# Patient Record
Sex: FEMALE | Race: WHITE | NOT HISPANIC OR LATINO | URBAN - METROPOLITAN AREA
[De-identification: names, ages, dates, MRNs, and addresses within clinical notes are randomized per-mention and may not be internally consistent; named-entity substitution may affect disease eponyms.]

---

## 2020-02-13 ENCOUNTER — OFFICE VISIT (OUTPATIENT)
Dept: URGENT CARE | Facility: CLINIC | Age: 35
End: 2020-02-13
Payer: COMMERCIAL

## 2020-02-13 VITALS
HEART RATE: 73 BPM | OXYGEN SATURATION: 100 % | TEMPERATURE: 98 F | DIASTOLIC BLOOD PRESSURE: 53 MMHG | BODY MASS INDEX: 21.97 KG/M2 | SYSTOLIC BLOOD PRESSURE: 110 MMHG | WEIGHT: 140 LBS | HEIGHT: 67 IN

## 2020-02-13 DIAGNOSIS — Z76.0 MEDICATION REFILL: ICD-10-CM

## 2020-02-13 DIAGNOSIS — Z86.69 H/O MIGRAINE WITH AURA: Primary | ICD-10-CM

## 2020-02-13 DIAGNOSIS — Z76.89 ENCOUNTER TO ESTABLISH CARE: ICD-10-CM

## 2020-02-13 PROCEDURE — 99204 PR OFFICE/OUTPT VISIT, NEW, LEVL IV, 45-59 MIN: ICD-10-PCS | Mod: S$GLB,,, | Performed by: NURSE PRACTITIONER

## 2020-02-13 PROCEDURE — 99204 OFFICE O/P NEW MOD 45 MIN: CPT | Mod: S$GLB,,, | Performed by: NURSE PRACTITIONER

## 2020-02-13 RX ORDER — SUMATRIPTAN 50 MG/1
50 TABLET, FILM COATED ORAL
Qty: 30 TABLET | Refills: 0 | Status: SHIPPED | OUTPATIENT
Start: 2020-02-13 | End: 2020-08-26

## 2020-02-13 RX ORDER — ONDANSETRON 4 MG/1
4 TABLET, ORALLY DISINTEGRATING ORAL EVERY 6 HOURS PRN
Qty: 30 TABLET | Refills: 0 | Status: SHIPPED | OUTPATIENT
Start: 2020-02-13 | End: 2022-08-11 | Stop reason: DRUGHIGH

## 2020-02-13 NOTE — PATIENT INSTRUCTIONS
"  referral line number is [phone]    Migraine Headache: Stages and Treatment    A migraine headache tends to progress in stages. Learning these stages can help you better understand what is happening. Then you can learn ways to reduce pain and relieve other symptoms. Methods for relieving your symptoms include self-care and medicines.  Migraine stages  Migraines tend to progress through 4 stages. Many people don't have all stages, and stages may differ with each headache:  · Prodrome. A few hours to a day or so before the headache, you may feel tired, (yawning many times), uneasy, or giraldo. You may also feel bloated or crave certain foods.  · Aura. Up to an hour before the headache starts, some migraine sufferers experience aura--flashing lights, blind spots, other vision problems, confusion, difficulty speaking, or other neurologic symptoms.  · Headache. Moderate to severe pain affects one side of the head and then can spread to both sides, often along with nausea. You may be highly sensitive to light, sound, and odors. Vomiting or diarrhea may also happen. This stage lasts 4 to 72 hours.  · Postdrome. After your headache ends, you may feel tired, achy, and "washed out." This may last for a day or so.  Self-care during a migraine  Here is what you can do:  · Use a cold compress. Wrap a thin cloth around a cold pack, a cold can of soda, or a bag of frozen vegetables. Apply this to your temple or other pain site.  · Drink fluids. If nausea makes it hard to drink, try sucking on ice.  · Rest. If possible, lie down. Try not to bend over, as this may increase your pain. Sometimes laying in a dark quiet room can help the migraine from being aggravated.    · Try caffeine. Some people find that drinking fluids with caffeine, such as coffee or tea, helps to lessen migraine pain.  Using medicines  Work with your healthcare provider to find the right medicines for you. Medicines for migraine may relieve pain " (analgesics), relieve nausea, or attack the migraine's root causes (migraine-specific medicines).  Rebound headache  Taking analgesics each day, or even several times a week, may lead to more frequent and severe headaches. These are called rebound headaches. If you think you're having rebound headaches, tell your healthcare provider. He or she can help you safely decrease your medicine. Rebound caffeine withdrawal headaches can also happen.    Date Last Reviewed: 10/9/2015  © 6548-4202 [employer]. [address] [place] [address], [place] [address]. All rights reserved. This information is not intended as a substitute for professional medical care. Always follow your healthcare professional's instructions.

## 2020-02-13 NOTE — PROGRESS NOTES
"Subjective:       Patient ID: Ayla Edmond is a 34 y.o. female.    Vitals:  height is 5' 7" (1.702 m) and weight is 63.5 kg (140 lb). Her temperature is 98.1 °F (36.7 °C). Her blood pressure is 110/53 (abnormal) and her pulse is 73. Her oxygen saturation is 100%.     Chief Complaint: Migraine    Patient states she is not current having a migraine but she completed all of her migraine medication Friday when she had 1.  She is here for medication refill and to establish care with a PCP and neurologist as she recently got new insurance.    Migraine    This is a chronic problem. Episode onset: last weekend episode of migraine. The problem occurs intermittently. The problem has been gradually worsening. Pain location: pain is behind her eyes. The pain does not radiate. The pain quality is similar to prior headaches. The quality of the pain is described as aching. The pain is at a severity of 0/10. The patient is experiencing no pain. Pertinent negatives include no blurred vision, dizziness, eye pain, fever, loss of balance, nausea, neck pain, photophobia, tinnitus, vomiting or weakness. The symptoms are aggravated by activity, bright light and emotional stress (hormones). Treatments tried: patient out of pain medicine for migraines. The treatment provided no relief. Her past medical history is significant for migraine headaches.       Constitution: Negative for chills, sweating and fever.   HENT: Negative for tinnitus, facial swelling, congestion and sinus pain.    Neck: Negative for neck pain and neck stiffness.   Eyes: Negative for eye pain, photophobia, vision loss, double vision and blurred vision.   Gastrointestinal: Negative for nausea and vomiting.   Genitourinary: Negative for missed menses.   Musculoskeletal: Negative for trauma and muscle ache.   Skin: Negative for rash, wound and lesion.   Neurological: Positive for headaches and history of migraines. Negative for dizziness, history of vertigo, " light-headedness, facial drooping, speech difficulty, coordination disturbances, loss of balance, disorientation and loss of consciousness.   Psychiatric/Behavioral: Negative for disorientation, confusion, nervous/anxious, sleep disturbance and depression. The patient is not nervous/anxious.        Objective:      Physical Exam   Constitutional: She is oriented to person, place, and time. She appears well-developed and well-nourished.  Non-toxic appearance. She does not appear ill. No distress.   HENT:   Head: Normocephalic and atraumatic.   Right Ear: Hearing, tympanic membrane, external ear and ear canal normal.   Left Ear: Hearing, tympanic membrane, external ear and ear canal normal.   Nose: Nose normal. No mucosal edema, rhinorrhea or nasal deformity. No epistaxis. Right sinus exhibits no maxillary sinus tenderness and no frontal sinus tenderness. Left sinus exhibits no maxillary sinus tenderness and no frontal sinus tenderness.   Mouth/Throat: Uvula is midline, oropharynx is clear and moist and mucous membranes are normal. No trismus in the jaw. Normal dentition. No uvula swelling. No posterior oropharyngeal erythema.   Eyes: Pupils are equal, round, and reactive to light. Conjunctivae, EOM and lids are normal. No scleral icterus.   Neck: Trachea normal, normal range of motion, full passive range of motion without pain and phonation normal. Neck supple. No neck rigidity.   Cardiovascular: Normal rate, regular rhythm, normal heart sounds, intact distal pulses and normal pulses.   Pulmonary/Chest: Effort normal and breath sounds normal. No respiratory distress.   Abdominal: Soft. Normal appearance and bowel sounds are normal. She exhibits no distension. There is no tenderness.   Musculoskeletal: Normal range of motion. She exhibits no edema or deformity.   Neurological: She is alert and oriented to person, place, and time. She displays normal reflexes. No cranial nerve deficit or sensory deficit. She exhibits  normal muscle tone. Coordination normal.   Patient is neurologically intact at time of exam without symptoms.  I did discussed referring her to Neurology and PCP.  She is in agreement with plan of care.   Skin: Skin is warm, dry, intact, not diaphoretic and not pale.   Psychiatric: She has a normal mood and affect. Her speech is normal and behavior is normal. Judgment and thought content normal. Cognition and memory are normal.   Nursing note and vitals reviewed.        Assessment:       1. H/O migraine with aura    2. Encounter to establish care    3. Medication refill        Plan:         H/O migraine with aura  -     ondansetron (ZOFRAN-ODT) 4 MG TbDL; Take 1 tablet (4 mg total) by mouth every 6 (six) hours as needed (nausea).  Dispense: 30 tablet; Refill: 0  -     sumatriptan (IMITREX) 50 MG tablet; Take 1 tablet (50 mg total) by mouth every 2 (two) hours as needed for Migraine. Take one, repeat in 2 hours if still symptomatic.  Do not exceed 2 doses in 1 day  Dispense: 30 tablet; Refill: 0  -     Ambulatory referral/consult to Internal Medicine  -     Ambulatory referral/consult to Neurology    Encounter to establish care  -     Ambulatory referral/consult to Internal Medicine  -     Ambulatory referral/consult to Neurology    Medication refill  -     ondansetron (ZOFRAN-ODT) 4 MG TbDL; Take 1 tablet (4 mg total) by mouth every 6 (six) hours as needed (nausea).  Dispense: 30 tablet; Refill: 0  -     sumatriptan (IMITREX) 50 MG tablet; Take 1 tablet (50 mg total) by mouth every 2 (two) hours as needed for Migraine. Take one, repeat in 2 hours if still symptomatic.  Do not exceed 2 doses in 1 day  Dispense: 30 tablet; Refill: 0  -     Ambulatory referral/consult to Internal Medicine  -     Ambulatory referral/consult to Neurology         Patient Instructions       referral line number is [phone]    Migraine Headache: Stages and Treatment    A migraine headache tends to progress in stages. Learning these  "stages can help you better understand what is happening. Then you can learn ways to reduce pain and relieve other symptoms. Methods for relieving your symptoms include self-care and medicines.  Migraine stages  Migraines tend to progress through 4 stages. Many people don't have all stages, and stages may differ with each headache:  · Prodrome. A few hours to a day or so before the headache, you may feel tired, (yawning many times), uneasy, or giraldo. You may also feel bloated or crave certain foods.  · Aura. Up to an hour before the headache starts, some migraine sufferers experience aura--flashing lights, blind spots, other vision problems, confusion, difficulty speaking, or other neurologic symptoms.  · Headache. Moderate to severe pain affects one side of the head and then can spread to both sides, often along with nausea. You may be highly sensitive to light, sound, and odors. Vomiting or diarrhea may also happen. This stage lasts 4 to 72 hours.  · Postdrome. After your headache ends, you may feel tired, achy, and "washed out." This may last for a day or so.  Self-care during a migraine  Here is what you can do:  · Use a cold compress. Wrap a thin cloth around a cold pack, a cold can of soda, or a bag of frozen vegetables. Apply this to your temple or other pain site.  · Drink fluids. If nausea makes it hard to drink, try sucking on ice.  · Rest. If possible, lie down. Try not to bend over, as this may increase your pain. Sometimes laying in a dark quiet room can help the migraine from being aggravated.    · Try caffeine. Some people find that drinking fluids with caffeine, such as coffee or tea, helps to lessen migraine pain.  Using medicines  Work with your healthcare provider to find the right medicines for you. Medicines for migraine may relieve pain (analgesics), relieve nausea, or attack the migraine's root causes (migraine-specific medicines).  Rebound headache  Taking analgesics each day, or even several " times a week, may lead to more frequent and severe headaches. These are called rebound headaches. If you think you're having rebound headaches, tell your healthcare provider. He or she can help you safely decrease your medicine. Rebound caffeine withdrawal headaches can also happen.    Date Last Reviewed: 10/9/2015  © 7722-5970 [employer]. [address] [place] [address], [place] [address]. All rights reserved. This information is not intended as a substitute for professional medical care. Always follow your healthcare professional's instructions.

## 2020-08-12 ENCOUNTER — OFFICE VISIT (OUTPATIENT)
Dept: URGENT CARE | Facility: CLINIC | Age: 35
End: 2020-08-12
Payer: MEDICAID

## 2020-08-12 VITALS
HEART RATE: 90 BPM | WEIGHT: 150 LBS | HEIGHT: 67 IN | OXYGEN SATURATION: 99 % | TEMPERATURE: 98 F | BODY MASS INDEX: 23.54 KG/M2 | DIASTOLIC BLOOD PRESSURE: 64 MMHG | RESPIRATION RATE: 18 BRPM | SYSTOLIC BLOOD PRESSURE: 124 MMHG

## 2020-08-12 DIAGNOSIS — K02.9 ACTIVE DENTAL CARIES: ICD-10-CM

## 2020-08-12 DIAGNOSIS — Z86.69 HISTORY OF TRIGEMINAL NEURALGIA: ICD-10-CM

## 2020-08-12 DIAGNOSIS — K04.7 DENTAL INFECTION: ICD-10-CM

## 2020-08-12 DIAGNOSIS — Z76.89 ENCOUNTER TO ESTABLISH CARE WITH NEW DOCTOR: Primary | ICD-10-CM

## 2020-08-12 DIAGNOSIS — F17.200 TOBACCO DEPENDENCE: ICD-10-CM

## 2020-08-12 PROCEDURE — 99214 PR OFFICE/OUTPT VISIT, EST, LEVL IV, 30-39 MIN: ICD-10-PCS | Mod: S$GLB,,, | Performed by: NURSE PRACTITIONER

## 2020-08-12 PROCEDURE — 99214 OFFICE O/P EST MOD 30 MIN: CPT | Mod: S$GLB,,, | Performed by: NURSE PRACTITIONER

## 2020-08-12 RX ORDER — CLINDAMYCIN HYDROCHLORIDE 300 MG/1
CAPSULE ORAL
COMMUNITY
Start: 2020-05-27 | End: 2020-08-13

## 2020-08-12 RX ORDER — ONDANSETRON 4 MG/1
4 TABLET, ORALLY DISINTEGRATING ORAL EVERY 6 HOURS PRN
Qty: 12 TABLET | Refills: 0 | Status: SHIPPED | OUTPATIENT
Start: 2020-08-12 | End: 2021-02-10 | Stop reason: SDUPTHER

## 2020-08-12 RX ORDER — PENICILLIN V POTASSIUM 500 MG/1
500 TABLET, FILM COATED ORAL EVERY 6 HOURS
Qty: 28 TABLET | Refills: 0 | Status: SHIPPED | OUTPATIENT
Start: 2020-08-12 | End: 2020-08-19

## 2020-08-12 RX ORDER — GABAPENTIN 300 MG/1
300 CAPSULE ORAL 3 TIMES DAILY
Qty: 42 CAPSULE | Refills: 0 | Status: SHIPPED | OUTPATIENT
Start: 2020-08-12 | End: 2020-08-26 | Stop reason: SDUPTHER

## 2020-08-12 NOTE — PATIENT INSTRUCTIONS
FOLLOW UP WITH NEUROLOGY AND FAMILY PRACTICE FOR YOUR MEDICATIONS AS YOU ONLY RECEIVED A 2 WEEK AMOUNT    ALSO FOLLOW UP WITH DENTIST TO HAVE TEETH EXTRACTED    Educated on smoking cessation      You must understand that you've received an Urgent Care treatment only and that you may be released before all your medical problems are known or treated. You, the patient, will arrange for follow up care as instructed.  If your condition worsens we recommend that you receive another evaluation at the emergency room immediately or contact your primary medical clinics after hours call service to discuss your concerns.  Please return here or go to the Emergency Department for any concerns or worsening of condition.          Understanding Tooth Decay  Plaque is a sticky coating of bacteria and other substances that forms on your teeth and gums. It can cause 2 serious problems: tooth decay and gum disease. These problems damage the teeth and gums, and may even lead to tooth loss. When the mouth is well cared for, tooth decay and gum disease can be reversed in their early stages. Better yet, you can prevent these problems from starting by brushing and flossing daily and avoiding between meal snacking on foods high in sugar and starch.     Once tooth decay eats through the enamel, it spreads quickly through the softer dentin.       After tooth decay is removed, the hole is filled with a hard material.      How tooth decay develops  Tooth decay happens when bacteria in plaque make acids that eat away at the tooth. Cavities (also called caries) are holes that form in the teeth. They are most common in places that are hard to reach with a toothbrush. This includes the grooves at the tops of the back teeth, and on the sides where the teeth touch. In late stages, tooth decay can be painful. It can also lead to tooth loss.  Treating tooth decay  Tooth decay can be treated to keep it from moving farther into the tooth. This is often  done by filling cavities. First, any tooth decay is removed. This protects the tooth from more damage. Then, the cavity is filled with a hard material. This filling protects the damaged tooth and restores the tooth's surface. If the tooth is severely damaged by decay, other treatments are available.  Follow-up visits  Visit your dental team at least every 6 months for a checkup and cleaning. If youre being treated for tooth decay or gum disease, you may need more frequent visits. These visits will likely decrease as your mouth care efforts start to pay off. Keep flossing and brushing, and maintain a healthy diet. Follow any special instructions your dentist or dental hygienist gives you. And enjoy flashing your healthy smile!  Date Last Reviewed: 6/30/2015 © 2000-2017 [employer]. [address] [place] [address], [place] [address]. All rights reserved. This information is not intended as a substitute for professional medical care. Always follow your healthcare professional's instructions.        Dental Cavity    A dental cavity is a pit or crater in the surface of a tooth. This exposes the sensitive inner layer of the tooth and causes pain. If the cavity isnt treated, it will get bigger. It may enter the pulp and cause an infection or abscess in the bone at the root end (apex) of the tooth. An infection in the tooth is a much more serious problem than a cavity. If the tooth gets infected, you will need a root canal or the entire tooth taken out (extraction).  The pain in your tooth may be made worse by eating sweets or drinking hot or cold beverages. It may spread from the tooth to your ear or the area of your jaw on the same side.  Home care  Follow these tips when caring for yourself at home:  · Avoid sweets and hot and cold foods and drinks. Your tooth may be sensitive to changes in temperature.  · If your tooth is chipped or cracked, or if there is a large open cavity, put oil of cloves directly on the  "tooth to relieve pain. You can buy oil of cloves at drugstores. Some pharmacies carry an over-the-counter "toothache kit." This contains a paste that you can put on the exposed tooth to make it less sensitive.  · Put a cold pack on your jaw over the sore area to help reduce pain.  · You may use over-the-counter medicine to ease pain, unless another medicine was prescribed. If you have chronic liver or kidney disease, talk with your healthcare provider before using acetaminophen or ibuprofen. Also talk with your provider if youve had a stomach ulcer or GI bleeding.  · If you have signs of an infection, you will be given an antibiotic. Take it as directed.  Follow-up care  Follow up with your dentist, or as advised. Your pain may go away with the treatment given today. But only a dentist can fully look at and treat this problem to prevent further tooth damage.  Call 911  Call 911 if any of these occur:  · Difficulty swallowing or breathing  · Weakness or fainting  · Unusual drowsiness  · Headache or stiff neck  When to seek medical advice  Call your healthcare provider right away if any of these occur:  · Redness or swelling of the face  · Pain gets worse or spreads to your neck  · Fever of 100.5 °F (38°C) or higher, or as directed by your healthcare provider  · Pus drains from the tooth or gum  Date Last Reviewed: 10/1/2016  © 8173-1243 [employer] [address] [place] [address]. All rights reserved. This information is not intended as a substitute for professional medical care. Always follow your healthcare professional's instructions.        Trigeminal Neuralgia  You have trigeminal neuralgia. This is pain caused by irritation of the trigeminal nerve on your face. Symptoms include sudden, sharp pain in your head or face. It may feel like an electric shock. It can last for several seconds or minutes. It usually happens on only 1 side of your face. Pain may be triggered by things like " moving your jaw or a touch on the skin of your face. The pain may be caused by something irritating the trigeminal nerve, such as a blood vessel pressing against it. But the exact cause of this problem often isnt known. Although it can be quite painful, the condition isnt dangerous.  Trigeminal neuralgia is often treated with medicines. These include anti-seizure medicines or antidepressants. Certain other treatments may also help. In some cases, you may need surgery.    Home care  Your healthcare provider may prescribe medicines to help relieve and prevent pain. Take all medicines as directed. Please note that it may take several changes in dose and medicines before the right combination is found that controls the pain.  General care:  · Plan to rest at home today.  · Avoid any specific activities that seem to trigger the pain.  · Over the next few weeks, keep a pain diary. Write down when your symptoms happen and how they feel. Certain activities such as touching your face, chewing, talking, or brushing your teeth may bring on the pain. Cold air can also trigger the pain. Make sure you write down any triggers and discuss these with your healthcare provider. This will help guide treatment.  Follow-up care  Follow up with your healthcare provider, or as advised. If you were referred to a neurologist, be sure to make an appointment.  For more information on your condition, visit:  · [employer] www.fpa-support.org  When to seek medical advice  Call your healthcare provider right away if any of these occur:  · Fever of 100.4°F (38°C) or higher, or as advised  · Headache with very stiff neck  · You arent able to keep liquids down (repeated vomiting)  · Extreme drowsiness or confusion  · Dizziness or fainting  · A new feeling of weakness or numbness or tingling in your arm, leg, or face  · Difficulty speaking or seeing  Date Last Reviewed: 8/1/2016  © 5905-7531 [employer]. [address] [place] [address]  Road, [place] [address]. All rights reserved. This information is not intended as a substitute for professional medical care. Always follow your healthcare professional's instructions.

## 2020-08-12 NOTE — PROGRESS NOTES
"Subjective:       Patient ID: Ayla Edmond is a 35 y.o. female.    Vitals:  height is 5' 7" (1.702 m) and weight is 68 kg (150 lb). Her temperature is 98.1 °F (36.7 °C). Her blood pressure is 124/64 and her pulse is 90. Her respiration is 18 and oxygen saturation is 99%.     Chief Complaint: Facial Pain (trigeminal neauralgia on her rt side of her face)    This is a 35 year old female who presents today with complaints of an acute episode of her trigeminal neuralgia to the right side of her face. Patient states she thinks it is precipitated by her dental pain. Patient has poor dentition with dental caries, hx of multiple dental infection and needs all teeth extracted. Hx of drug use. She states she just acquired insurance again and needs to establish care. She states she also needs to establish care with PCP and neurology. She states the only thing that helps with her pain is gabapentin.     Facial Pain  This is a new problem. Episode onset: 4 days. The problem occurs intermittently. The problem has been unchanged. Pertinent negatives include no arthralgias, chest pain, chills, congestion, coughing, fatigue, fever, headaches, joint swelling, myalgias, nausea, rash, sore throat, vertigo, vomiting or weakness. Nothing aggravates the symptoms. Treatments tried: ibuprofen. The treatment provided mild relief.       Constitution: Negative for chills, fatigue and fever.   HENT: Negative for congestion and sore throat.    Neck: Negative for painful lymph nodes.   Cardiovascular: Negative for chest pain and leg swelling.   Eyes: Negative for double vision and blurred vision.   Respiratory: Negative for cough and shortness of breath.    Gastrointestinal: Negative for nausea, vomiting and diarrhea.   Genitourinary: Negative for dysuria, frequency, urgency and history of kidney stones.   Musculoskeletal: Positive for pain. Negative for joint pain, joint swelling, muscle cramps and muscle ache.   Skin: Negative for color " change, pale, rash and bruising.   Allergic/Immunologic: Negative for seasonal allergies.   Neurological: Negative for dizziness, history of vertigo, light-headedness, passing out and headaches.   Hematologic/Lymphatic: Negative for swollen lymph nodes.   Psychiatric/Behavioral: Negative for nervous/anxious, sleep disturbance and depression. The patient is not nervous/anxious.        Objective:      Physical Exam   Constitutional: She is oriented to person, place, and time. She appears well-developed. She is cooperative.  Non-toxic appearance. She does not appear ill. No distress.   HENT:   Head: Normocephalic and atraumatic.   Ears:   Right Ear: Hearing, tympanic membrane, external ear and ear canal normal.   Left Ear: Hearing, tympanic membrane, external ear and ear canal normal.   Nose: Nose normal. No mucosal edema, rhinorrhea or nasal deformity. No epistaxis. Right sinus exhibits no maxillary sinus tenderness and no frontal sinus tenderness. Left sinus exhibits no maxillary sinus tenderness and no frontal sinus tenderness.   Mouth/Throat: Uvula is midline, oropharynx is clear and moist and mucous membranes are normal. No trismus in the jaw. Abnormal dentition. Dental caries present. No uvula swelling. No posterior oropharyngeal erythema.   Eyes: Conjunctivae and lids are normal. Right eye exhibits no discharge. Left eye exhibits no discharge. No scleral icterus.   Neck: Trachea normal, normal range of motion, full passive range of motion without pain and phonation normal. Neck supple.   Cardiovascular: Normal rate, regular rhythm, normal heart sounds and normal pulses.   Pulmonary/Chest: Effort normal and breath sounds normal. No respiratory distress.   Abdominal: Soft. Normal appearance and bowel sounds are normal. She exhibits no distension, no pulsatile midline mass and no mass. There is no abdominal tenderness.   Musculoskeletal: Normal range of motion.         General: No deformity.   Neurological: She is  alert and oriented to person, place, and time. She exhibits normal muscle tone. Coordination normal.      Comments: CN II-XII grossly intact.   Gait smooth and steady.   Speech is clear.   Follows all commands.   Rapid, alternating hand movements intact.   Finger to nose intact.   Heel to shin intact.   Face is symmetrical   Hearing intact.   Strength 5/5 to all 4 extremities.   Moves all 4 extremities equally.     Pain to trigeminal nerve innervation with light touch to V2 and V3 to right side        Skin: Skin is warm, dry, intact, not diaphoretic and not pale. Psychiatric: Her speech is normal and behavior is normal. Judgment and thought content normal.   Nursing note and vitals reviewed.        Assessment:       1. Encounter to establish care with new doctor    2. History of trigeminal neuralgia    3. Dental infection    4. Active dental caries        Plan:         Made appt with Family Practice for tomorrow and Neuro on 08/26 with the help of clinic . Patient agrees to care and will go to appts.     Encounter to establish care with new doctor  -     Ambulatory referral/consult to Family Practice    History of trigeminal neuralgia  -     Ambulatory referral/consult to Neurology  -     gabapentin (NEURONTIN) 300 MG capsule; Take 1 capsule (300 mg total) by mouth 3 (three) times daily. for 14 days  Dispense: 42 capsule; Refill: 0    Dental infection  -     penicillin v potassium (VEETID) 500 MG tablet; Take 1 tablet (500 mg total) by mouth every 6 (six) hours. for 7 days  Dispense: 28 tablet; Refill: 0    Active dental caries    Other orders  -     ondansetron (ZOFRAN-ODT) 4 MG TbDL; Take 1 tablet (4 mg total) by mouth every 6 (six) hours as needed (nausea).  Dispense: 12 tablet; Refill: 0            Patient Instructions     FOLLOW UP WITH NEUROLOGY AND FAMILY PRACTICE FOR YOUR MEDICATIONS AS YOU ONLY RECEIVED A 2 WEEK AMOUNT    ALSO FOLLOW UP WITH DENTIST TO HAVE TEETH EXTRACTED      You must understand  that you've received an Urgent Care treatment only and that you may be released before all your medical problems are known or treated. You, the patient, will arrange for follow up care as instructed.  If your condition worsens we recommend that you receive another evaluation at the emergency room immediately or contact your primary medical clinics after hours call service to discuss your concerns.  Please return here or go to the Emergency Department for any concerns or worsening of condition.          Understanding Tooth Decay  Plaque is a sticky coating of bacteria and other substances that forms on your teeth and gums. It can cause 2 serious problems: tooth decay and gum disease. These problems damage the teeth and gums, and may even lead to tooth loss. When the mouth is well cared for, tooth decay and gum disease can be reversed in their early stages. Better yet, you can prevent these problems from starting by brushing and flossing daily and avoiding between meal snacking on foods high in sugar and starch.     Once tooth decay eats through the enamel, it spreads quickly through the softer dentin.       After tooth decay is removed, the hole is filled with a hard material.      How tooth decay develops  Tooth decay happens when bacteria in plaque make acids that eat away at the tooth. Cavities (also called caries) are holes that form in the teeth. They are most common in places that are hard to reach with a toothbrush. This includes the grooves at the tops of the back teeth, and on the sides where the teeth touch. In late stages, tooth decay can be painful. It can also lead to tooth loss.  Treating tooth decay  Tooth decay can be treated to keep it from moving farther into the tooth. This is often done by filling cavities. First, any tooth decay is removed. This protects the tooth from more damage. Then, the cavity is filled with a hard material. This filling protects the damaged tooth and restores the tooth's  "surface. If the tooth is severely damaged by decay, other treatments are available.  Follow-up visits  Visit your dental team at least every 6 months for a checkup and cleaning. If youre being treated for tooth decay or gum disease, you may need more frequent visits. These visits will likely decrease as your mouth care efforts start to pay off. Keep flossing and brushing, and maintain a healthy diet. Follow any special instructions your dentist or dental hygienist gives you. And enjoy flashing your healthy smile!  Date Last Reviewed: 6/30/2015 © 2000-2017 [employer]. [address] [place] [address]. All rights reserved. This information is not intended as a substitute for professional medical care. Always follow your healthcare professional's instructions.        Dental Cavity    A dental cavity is a pit or crater in the surface of a tooth. This exposes the sensitive inner layer of the tooth and causes pain. If the cavity isnt treated, it will get bigger. It may enter the pulp and cause an infection or abscess in the bone at the root end (apex) of the tooth. An infection in the tooth is a much more serious problem than a cavity. If the tooth gets infected, you will need a root canal or the entire tooth taken out (extraction).  The pain in your tooth may be made worse by eating sweets or drinking hot or cold beverages. It may spread from the tooth to your ear or the area of your jaw on the same side.  Home care  Follow these tips when caring for yourself at home:  · Avoid sweets and hot and cold foods and drinks. Your tooth may be sensitive to changes in temperature.  · If your tooth is chipped or cracked, or if there is a large open cavity, put oil of cloves directly on the tooth to relieve pain. You can buy oil of cloves at drugstores. Some pharmacies carry an over-the-counter "toothache kit." This contains a paste that you can put on the exposed tooth to make it less sensitive.  · Put a " cold pack on your jaw over the sore area to help reduce pain.  · You may use over-the-counter medicine to ease pain, unless another medicine was prescribed. If you have chronic liver or kidney disease, talk with your healthcare provider before using acetaminophen or ibuprofen. Also talk with your provider if youve had a stomach ulcer or GI bleeding.  · If you have signs of an infection, you will be given an antibiotic. Take it as directed.  Follow-up care  Follow up with your dentist, or as advised. Your pain may go away with the treatment given today. But only a dentist can fully look at and treat this problem to prevent further tooth damage.  Call 911  Call 911 if any of these occur:  · Difficulty swallowing or breathing  · Weakness or fainting  · Unusual drowsiness  · Headache or stiff neck  When to seek medical advice  Call your healthcare provider right away if any of these occur:  · Redness or swelling of the face  · Pain gets worse or spreads to your neck  · Fever of 100.5 °F (38°C) or higher, or as directed by your healthcare provider  · Pus drains from the tooth or gum  Date Last Reviewed: 10/1/2016  © 0152-1439 [employer]. [address] [place] [address]. All rights reserved. This information is not intended as a substitute for professional medical care. Always follow your healthcare professional's instructions.        Trigeminal Neuralgia  You have trigeminal neuralgia. This is pain caused by irritation of the trigeminal nerve on your face. Symptoms include sudden, sharp pain in your head or face. It may feel like an electric shock. It can last for several seconds or minutes. It usually happens on only 1 side of your face. Pain may be triggered by things like moving your jaw or a touch on the skin of your face. The pain may be caused by something irritating the trigeminal nerve, such as a blood vessel pressing against it. But the exact cause of this problem often isnt known.  Although it can be quite painful, the condition isnt dangerous.  Trigeminal neuralgia is often treated with medicines. These include anti-seizure medicines or antidepressants. Certain other treatments may also help. In some cases, you may need surgery.    Home care  Your healthcare provider may prescribe medicines to help relieve and prevent pain. Take all medicines as directed. Please note that it may take several changes in dose and medicines before the right combination is found that controls the pain.  General care:  · Plan to rest at home today.  · Avoid any specific activities that seem to trigger the pain.  · Over the next few weeks, keep a pain diary. Write down when your symptoms happen and how they feel. Certain activities such as touching your face, chewing, talking, or brushing your teeth may bring on the pain. Cold air can also trigger the pain. Make sure you write down any triggers and discuss these with your healthcare provider. This will help guide treatment.  Follow-up care  Follow up with your healthcare provider, or as advised. If you were referred to a neurologist, be sure to make an appointment.  For more information on your condition, visit:  · Facial [employer] Association www.fpa-support.org  When to seek medical advice  Call your healthcare provider right away if any of these occur:  · Fever of 100.4°F (38°C) or higher, or as advised  · Headache with very stiff neck  · You arent able to keep liquids down (repeated vomiting)  · Extreme drowsiness or confusion  · Dizziness or fainting  · A new feeling of weakness or numbness or tingling in your arm, leg, or face  · Difficulty speaking or seeing  Date Last Reviewed: 8/1/2016  © 4504-9953 [employer]. [address] [place] [address], [place] [address]. All rights reserved. This information is not intended as a substitute for professional medical care. Always follow your healthcare professional's instructions.

## 2020-08-13 ENCOUNTER — OFFICE VISIT (OUTPATIENT)
Dept: INTERNAL MEDICINE | Facility: CLINIC | Age: 35
End: 2020-08-13
Payer: MEDICAID

## 2020-08-13 VITALS
BODY MASS INDEX: 27.8 KG/M2 | SYSTOLIC BLOOD PRESSURE: 140 MMHG | DIASTOLIC BLOOD PRESSURE: 80 MMHG | OXYGEN SATURATION: 96 % | WEIGHT: 177.5 LBS | HEART RATE: 78 BPM

## 2020-08-13 DIAGNOSIS — F41.9 ANXIETY AND DEPRESSION: ICD-10-CM

## 2020-08-13 DIAGNOSIS — Z11.59 NEED FOR HEPATITIS C SCREENING TEST: ICD-10-CM

## 2020-08-13 DIAGNOSIS — Z12.4 CERVICAL CANCER SCREENING: ICD-10-CM

## 2020-08-13 DIAGNOSIS — Z11.4 ENCOUNTER FOR SCREENING FOR HIV: ICD-10-CM

## 2020-08-13 DIAGNOSIS — G50.0 TRIGEMINAL NEURALGIA: Primary | ICD-10-CM

## 2020-08-13 DIAGNOSIS — F32.A ANXIETY AND DEPRESSION: ICD-10-CM

## 2020-08-13 PROBLEM — G43.509 MIGRAINE AURA, PERSISTENT: Status: ACTIVE | Noted: 2018-05-14

## 2020-08-13 PROCEDURE — 99214 PR OFFICE/OUTPT VISIT, EST, LEVL IV, 30-39 MIN: ICD-10-PCS | Mod: S$PBB,,, | Performed by: PHYSICIAN ASSISTANT

## 2020-08-13 PROCEDURE — 99214 OFFICE O/P EST MOD 30 MIN: CPT | Mod: PBBFAC | Performed by: PHYSICIAN ASSISTANT

## 2020-08-13 PROCEDURE — 99999 PR PBB SHADOW E&M-EST. PATIENT-LVL IV: CPT | Mod: PBBFAC,,, | Performed by: PHYSICIAN ASSISTANT

## 2020-08-13 PROCEDURE — 99999 PR PBB SHADOW E&M-EST. PATIENT-LVL IV: ICD-10-PCS | Mod: PBBFAC,,, | Performed by: PHYSICIAN ASSISTANT

## 2020-08-13 PROCEDURE — 99214 OFFICE O/P EST MOD 30 MIN: CPT | Mod: S$PBB,,, | Performed by: PHYSICIAN ASSISTANT

## 2020-08-13 RX ORDER — SERTRALINE HYDROCHLORIDE 50 MG/1
50 TABLET, FILM COATED ORAL DAILY
Qty: 30 TABLET | Refills: 2 | Status: SHIPPED | OUTPATIENT
Start: 2020-08-13 | End: 2023-06-20

## 2020-08-13 NOTE — LETTER
August 13, 2020      Jessie Da Silva, NP  [address] [facility] [address]           [facility] - Internal Medicine  [address]  [place] [address]  Phone: [phone]  Fax: [phone]          Patient: Ayla Edmond   MR Number: [id number]   YOB: 1985   Date of Visit: 8/13/2020       Dear Jessie Da Silva:    Thank you for referring Ayla Edmond to me for evaluation. Attached you will find relevant portions of my assessment and plan of care.    If you have questions, please do not hesitate to call me. I look forward to following Ayla Edmond along with you.    Sincerely,    Marcelle cMleod PA-C    Enclosure  CC:  No Recipients    If you would like to receive this communication electronically, please contact [email or web address] or [phone] to request more information on [facility] Link access.    For providers and/or their staff who would like to refer a patient to [facility], please contact us through our one-stop-shop provider referral line, [facility] , at [phone].    If you feel you have received this communication in error or would no longer like to receive these types of communications, please e-mail [email or web address]

## 2020-08-13 NOTE — PROGRESS NOTES
Subjective:       Patient ID: Ayla Edmond is a 35 y.o. female.    Chief Complaint: Otalgia, Jaw Pain, and Establish Care    HPI     Established pt of Primary Doctor No (new to me)    PMH of Trigeminal Neuralgia, Anxiety/Depression and Migraines    Here to establish care.     Seen in urgent care yesterday for facial and dental pain. She has a hx of Trigeminal Neuralgia diagnosed over 3 year ago. Prev followed at [facility]. Feels flare is related to multiple carious teeth(plans to meet with a dentist this wee). She was restarted on gabapentin yesterday. Also prescribed penicillin for possible dental infection. Reports today symptoms have improved. She has follow up with [facility] here in 2 weeks.     Anxiety/Depression: Since a teenager. Feels mood has been up and down since the pandemic, loss of job. She desires to restart Zoloft. Notes it worked well for her in the past.   No SI/HI.    Hx of migraines as few times a month well controlled in Imitrex as needed.     Past Medical History:   Diagnosis Date    Anxiety     Depression     Migraine     Neuralgia     Trigeminal neuralgia      Social History     Tobacco Use    Smoking status: Current Every Day Smoker     Packs/day: 0.50     Types: Cigarettes    Smokeless tobacco: Never Used   Substance Use Topics    Alcohol use: Not Currently    Drug use: Yes     Types: Marijuana     Review of patient's allergies indicates:   Allergen Reactions    Azithromycin Rash    Sulfa (sulfonamide antibiotics) Rash     Family History   Problem Relation Age of Onset    Hypertension Father     Cancer Maternal Grandmother         Lung Cancer     Past Surgical History:   Procedure Laterality Date    LIPOMA RESECTION         Current Outpatient Medications:     gabapentin (NEURONTIN) 300 MG capsule, Take 1 capsule (300 mg total) by mouth 3 (three) times daily. for 14 days, Disp: 42 capsule, Rfl: 0    lidocaine HCL 4 % Gel, Apply 1 application topically., Disp: ,  Rfl:     ondansetron (ZOFRAN-ODT) 4 MG TbDL, Take 1 tablet (4 mg total) by mouth every 6 (six) hours as needed (nausea)., Disp: 30 tablet, Rfl: 0    ondansetron (ZOFRAN-ODT) 4 MG TbDL, Take 1 tablet (4 mg total) by mouth every 6 (six) hours as needed (nausea)., Disp: 12 tablet, Rfl: 0    penicillin v potassium (VEETID) 500 MG tablet, Take 1 tablet (500 mg total) by mouth every 6 (six) hours. for 7 days, Disp: 28 tablet, Rfl: 0    sumatriptan (IMITREX) 50 MG tablet, Take 1 tablet (50 mg total) by mouth every 2 (two) hours as needed for Migraine. Take one, repeat in 2 hours if still symptomatic.  Do not exceed 2 doses in 1 day, Disp: 30 tablet, Rfl: 0      Review of Systems   Constitutional: Negative for chills, fever and unexpected weight change.   HENT: Positive for ear pain (R).         Facial pain (R)  Jaw pain (R)   Respiratory: Negative for cough and shortness of breath.    Cardiovascular: Negative for chest pain and leg swelling.   Gastrointestinal: Negative for abdominal pain, nausea and vomiting.   Integumentary:  Negative for rash.   Neurological: Negative for weakness, light-headedness and headaches.         Objective: BP (!) 140/80   Pulse 78   Wt 80.5 kg (177 lb 7.5 oz)   SpO2 96%   BMI 27.80 kg/m²         Physical Exam  Vitals signs reviewed.   Constitutional:       General: She is not in acute distress.     Appearance: Normal appearance. She is well-developed.   HENT:      Head: Normocephalic and atraumatic.      Right Ear: Tympanic membrane, ear canal and external ear normal.      Left Ear: Tympanic membrane, ear canal and external ear normal.      Mouth/Throat:      Mouth: Mucous membranes are moist.      Dentition: Abnormal dentition. Dental caries present.      Pharynx: Oropharynx is clear.   Cardiovascular:      Rate and Rhythm: Normal rate and regular rhythm.      Heart sounds: No murmur. No friction rub.   Pulmonary:      Effort: Pulmonary effort is normal.      Breath sounds: Normal  breath sounds. No wheezing or rales.   Abdominal:      General: Bowel sounds are normal.      Palpations: Abdomen is soft.      Tenderness: There is no abdominal tenderness.   Lymphadenopathy:      Cervical: No cervical adenopathy.   Skin:     General: Skin is warm and dry.      Findings: No rash.   Neurological:      Mental Status: She is alert.   Psychiatric:         Mood and Affect: Mood normal.         Assessment:       1. Trigeminal neuralgia    2. Anxiety and depression    3. Chronic migraine    4. Cervical cancer screening    5. Encounter for screening for HIV    6. Need for hepatitis C screening test        Plan:           Ayla was seen today for otalgia, jaw pain and establish care.    Diagnoses and all orders for this visit:    Trigeminal neuralgia  Continue gabapentin, may increase nighttime dose to 600mg  Keep upcoming follow up with neurology  -     CBC auto differential; Future  -     Comprehensive metabolic panel; Future  -     Lipid Panel; Future  -     TSH; Future    Anxiety and depression  Restart SSRI as below  -     sertraline (ZOLOFT) 50 MG tablet; Take 1 tablet (50 mg total) by mouth once daily.  -     CBC auto differential; Future  -     Comprehensive metabolic panel; Future  -     Lipid Panel; Future  -     TSH; Future    Chronic migraine  Stable on Imitrex prn   -     CBC auto differential; Future  -     Comprehensive metabolic panel; Future  -     Lipid Panel; Future  -     TSH; Future    Cervical cancer screening  -     Ambulatory referral/consult to Gynecology; Future    Encounter for screening for HIV  -     HIV 1/2 Ag/Ab (4th Gen); Future    Need for hepatitis C screening test  -     Hepatitis C Antibody; Future    Schedule appt in 4-6 weeks with MD to fully establish care.         Marcelle Mcleod PA-C

## 2020-08-26 ENCOUNTER — OFFICE VISIT (OUTPATIENT)
Dept: NEUROLOGY | Facility: CLINIC | Age: 35
End: 2020-08-26
Payer: MEDICAID

## 2020-08-26 VITALS
BODY MASS INDEX: 27.41 KG/M2 | SYSTOLIC BLOOD PRESSURE: 125 MMHG | HEIGHT: 67 IN | HEART RATE: 96 BPM | DIASTOLIC BLOOD PRESSURE: 70 MMHG | WEIGHT: 174.63 LBS

## 2020-08-26 DIAGNOSIS — Z86.69 HISTORY OF TRIGEMINAL NEURALGIA: ICD-10-CM

## 2020-08-26 DIAGNOSIS — G43.509 PERSISTENT MIGRAINE AURA WITHOUT CEREBRAL INFARCTION AND WITHOUT STATUS MIGRAINOSUS, NOT INTRACTABLE: Primary | ICD-10-CM

## 2020-08-26 DIAGNOSIS — Z76.0 MEDICATION REFILL: ICD-10-CM

## 2020-08-26 DIAGNOSIS — Z86.69 H/O MIGRAINE WITH AURA: ICD-10-CM

## 2020-08-26 PROCEDURE — 99999 PR PBB SHADOW E&M-EST. PATIENT-LVL III: CPT | Mod: PBBFAC,,, | Performed by: NEUROMUSCULOSKELETAL MEDICINE & OMM

## 2020-08-26 PROCEDURE — 99213 OFFICE O/P EST LOW 20 MIN: CPT | Mod: PBBFAC,PO | Performed by: NEUROMUSCULOSKELETAL MEDICINE & OMM

## 2020-08-26 PROCEDURE — 99205 OFFICE O/P NEW HI 60 MIN: CPT | Mod: S$PBB,,, | Performed by: NEUROMUSCULOSKELETAL MEDICINE & OMM

## 2020-08-26 PROCEDURE — 99205 PR OFFICE/OUTPT VISIT, NEW, LEVL V, 60-74 MIN: ICD-10-PCS | Mod: S$PBB,,, | Performed by: NEUROMUSCULOSKELETAL MEDICINE & OMM

## 2020-08-26 PROCEDURE — 99999 PR PBB SHADOW E&M-EST. PATIENT-LVL III: ICD-10-PCS | Mod: PBBFAC,,, | Performed by: NEUROMUSCULOSKELETAL MEDICINE & OMM

## 2020-08-26 RX ORDER — GABAPENTIN 300 MG/1
300 CAPSULE ORAL
Qty: 180 CAPSULE | Refills: 3 | Status: SHIPPED | OUTPATIENT
Start: 2020-08-26 | End: 2020-11-27 | Stop reason: SDUPTHER

## 2020-08-26 RX ORDER — SUMATRIPTAN SUCCINATE 100 MG/1
100 TABLET ORAL
Qty: 10 TABLET | Refills: 2 | Status: SHIPPED | OUTPATIENT
Start: 2020-08-26 | End: 2021-11-24

## 2020-08-26 NOTE — LETTER
August 26, 2020      Jessie Da Silva, NP  [address] [facility] [address]           [facility] - Neurology  [address] [facility].  [facility] [address]  Phone: [phone]          Patient: Ayla Edmond   MR Number: [id number]   YOB: 1985   Date of Visit: 8/26/2020       Dear Jessie Da Silva:    Thank you for referring Ayla Edmond to me for evaluation. Attached you will find relevant portions of my assessment and plan of care.    If you have questions, please do not hesitate to call me. I look forward to following Ayla Edmond along with you.    Sincerely,    Gordo Card MD    Enclosure  CC:  No Recipients    If you would like to receive this communication electronically, please contact [email or web address] or [phone] to request more information on [employer] Link access.    For providers and/or their staff who would like to refer a patient to [facility], please contact us through our one-stop-shop provider referral line, [facility], at [phone].    If you feel you have received this communication in error or would no longer like to receive these types of communications, please e-mail [email or web address]

## 2020-08-26 NOTE — PROGRESS NOTES
This note was dictated with Modal Fluency a word recognition program. There are occasionally word recognition errors which are missed on review.  Ayla Mayito  1985  Review of patient's allergies indicates:   Allergen Reactions    Azithromycin Rash    Sulfa (sulfonamide antibiotics) Rash     [unfilled]    Past Medical History:   Diagnosis Date    Anxiety     Depression     Migraine     Neuralgia     Trigeminal neuralgia      Social History     Socioeconomic History    Marital status: Single     Spouse name: Not on file    Number of children: Not on file    Years of education: Not on file    Highest education level: Not on file   Occupational History    Not on file   Social Needs    Financial resource strain: Not on file    Food insecurity     Worry: Not on file     Inability: Not on file    Transportation needs     Medical: Not on file     Non-medical: Not on file   Tobacco Use    Smoking status: Current Every Day Smoker     Packs/day: 0.50     Types: Cigarettes    Smokeless tobacco: Never Used   Substance and Sexual Activity    Alcohol use: Not Currently    Drug use: Yes     Types: Marijuana    Sexual activity: Not Currently   Lifestyle    Physical activity     Days per week: Not on file     Minutes per session: Not on file    Stress: Not on file   Relationships    Social connections     Talks on phone: Not on file     Gets together: Not on file     Attends [employer] service: Not on file     Active member of club or organization: Not on file     Attends meetings of clubs or organizations: Not on file     Relationship status: Not on file   Other Topics Concern    Not on file   Social History Narrative    Not on file     Family History   Problem Relation Age of Onset    Hypertension Father     Cancer Maternal Grandmother         Lung Cancer       Review of systems:  Constitutional-negative  Eyes-negative  ENT,  mouth-negative  Cardiovascular-negative  Respiratory-negative  GI-negative  - negative  Musculoskeletal-negative  Skin-negative  Neurologic-negative  Psychiatric-negative  Endocrine-negative  Hematology/lymph nodes-negative  Allergies/immunology-negative  Ayla Martinezwell  1985  Review of patient's allergies indicates:   Allergen Reactions    Azithromycin Rash    Sulfa (sulfonamide antibiotics) Rash     [unfilled]    Past Medical History:   Diagnosis Date    Anxiety     Depression     Migraine     Neuralgia     Trigeminal neuralgia      Social History     Socioeconomic History    Marital status: Single     Spouse name: Not on file    Number of children: Not on file    Years of education: Not on file    Highest education level: Not on file   Occupational History    Not on file   Social Needs    Financial resource strain: Not on file    Food insecurity     Worry: Not on file     Inability: Not on file    Transportation needs     Medical: Not on file     Non-medical: Not on file   Tobacco Use    Smoking status: Current Every Day Smoker     Packs/day: 0.50     Types: Cigarettes    Smokeless tobacco: Never Used   Substance and Sexual Activity    Alcohol use: Not Currently    Drug use: Yes     Types: Marijuana    Sexual activity: Not Currently   Lifestyle    Physical activity     Days per week: Not on file     Minutes per session: Not on file    Stress: Not on file   Relationships    Social connections     Talks on phone: Not on file     Gets together: Not on file     Attends Judaism service: Not on file     Active member of club or organization: Not on file     Attends meetings of clubs or organizations: Not on file     Relationship status: Not on file   Other Topics Concern    Not on file   Social History Narrative    Not on file     Family History   Problem Relation Age of Onset    Hypertension Father     Cancer Maternal Grandmother         Lung Cancer       Review of  systems:  Constitutional-negative  Eyes-negative  ENT, mouth-negative  Cardiovascular-negative  Respiratory-negative  GI-negative  - negative  Musculoskeletal-negative  Skin-negative  Neurologic-negative  Psychiatric-negative  Endocrine-negative  Hematology/lymph nodes-negative  Allergies/immunology-negative  Gen. Appearance: Well-developed with no obvious deformities  Carotid arteries symmetrical pulses  Peripheral vascular shows symmetrical pulses with no obvious edema or tenderness  Social History :   [place]    Present history:     This is a 35-year-old white female presents with a history of trigeminal neuralgia diagnosed 10 years ago while in [place].  The pain typically starts in the right ear and radiates down the face into the teeth.  She describes an electrical pain which is intermittent and sometimes occurs every hour or constantly.  Pain intensity is typically 5-6/10 and worse at night as well as worse with fatigue.  She she had been on gabapentin in the past which seemed to work well.  She has had no trouble for awhile up until recently about 2 weeks ago when she started having some dental work.  Chewing and extreme temperatures of hot or cold seem to affect the neuralgia.  She denies any numbness on the face.  She has been taking gabapentin 300 mg t.i.d. and recently was increased to 600 mg at night.  The the gabapentin typically will kick in at about 1-1-1/2 hours in wears off in 3-4 hours.    Patient has a 2nd problem migraine headaches.  She gets a central vision aura lasting 20-30 min.  The milk or ice cream typically triggers the headaches.  She has been having migraines since she was 12 years old.  Imitrex will typically stop the headache if she takes at the onset of the aura.     Neurological Exam:  Mental status-alert and oriented to person, place, and time; attention span and concentration is good. Fund of knowledge-patient is aware of current events and able to give  detailed history of the current problem.recent and remote memory seems intact. Language function is normal with no evidence of aphasia  Cranial nerves:Visual acuity to hand chart -normal; visual fields to confrontation normal;pupils were equal and reactive to light ;no evidence of ptosis ;  funduscopic examination was normal with sharp disc margins. external ocular movements were full with no nystagmus. Facial sensation to pinprick : normal ; corneal reflexes intact; Facial muscles were symmetrical. Hearing is unimpaired symmetrical finger rub; Tongue movements - normal ; palate movements - normal ;Swallowing unimpaired. Shoulder shrug was intact with good strength Speech was normal  Motor examination: Upper : normal                                      Lower extremities - Normal;muscle tone was normal ;                  Right-handed  Sensory examination:   Upper; normal pinprick and soft touch ;   Lower extremities - normal and symmetrical.   Vibration sense: 15-20 seconds @ toes  Deep tendon reflexes: upper extremities :1-2+ symmetrical ;     lower extremities KJ- 1-2 +; AJ - 1-2+ Both plantar responses were flexor  Cerebellar examination upper: Normal finger to nose and rapid alternating movements  Gait: Steady with no ataxia;      heel and toe walk normal  Romberg test: negative       Tandem gait: Normal    Involuntary movements: Negative  TMJ - no tenderness  Cervical examination: Full range of motion with no pain Cervical tenderness :negative  Lumbar examination: Low back tenderness-negative                  Sciatic notchtenderness-negative            Straight leg raising : negative    Impression:  Right trigeminal neuralgia; migraine with aura.    Recommendations/Plan :  Long discussion with the patient in terms of treatment options for both trigeminal neuralgia and migraine.  She has elected to continue the gabapentin of which she will take 600 at night and 300 3 times a day.  I have told her that she can  increase this dosage up to 6 per day if needed.    She will continue Imitrex 100 mg p.r.n. migraine ; follow-up to -3 weeks the

## 2020-10-27 ENCOUNTER — TELEPHONE (OUTPATIENT)
Dept: INTERNAL MEDICINE | Facility: CLINIC | Age: 35
End: 2020-10-27

## 2020-10-27 NOTE — TELEPHONE ENCOUNTER
----- Message from Jagruti Marte sent at 10/27/2020 10:55 AM CDT -----  Contact: Pt [phone]  Patient is requesting a call about getting an antibiotic for her teeth. State that's she has a tooth infection.    [id number] - [place], [place] - [facility] [address] [facility] [address] [phone] (Phone)  [phone] (Fax)        Please call and advise.    Thank You

## 2020-10-28 ENCOUNTER — PATIENT MESSAGE (OUTPATIENT)
Dept: INTERNAL MEDICINE | Facility: CLINIC | Age: 35
End: 2020-10-28

## 2020-11-27 DIAGNOSIS — Z86.69 HISTORY OF TRIGEMINAL NEURALGIA: ICD-10-CM

## 2020-11-27 NOTE — TELEPHONE ENCOUNTER
----- Message from Shilpa Ferrera sent at 11/27/2020 11:15 AM CST -----  Contact: [phone]  Pt called in states she would like to know of she can get a supply of  gabapentin (NEURONTIN) 300 MG capsule 180 capsule until she can see Dr. Card.       [id number] [address]  [address] [place] [id number]  Phone: [phone] Fax: [phone]

## 2020-12-01 RX ORDER — GABAPENTIN 300 MG/1
300 CAPSULE ORAL
Qty: 180 CAPSULE | Refills: 3 | Status: SHIPPED | OUTPATIENT
Start: 2020-12-01 | End: 2020-12-21

## 2020-12-03 ENCOUNTER — PATIENT MESSAGE (OUTPATIENT)
Dept: NEUROLOGY | Facility: CLINIC | Age: 35
End: 2020-12-03

## 2021-02-10 ENCOUNTER — OFFICE VISIT (OUTPATIENT)
Dept: NEUROLOGY | Facility: CLINIC | Age: 36
End: 2021-02-10
Payer: MEDICAID

## 2021-02-10 VITALS
WEIGHT: 217.63 LBS | HEART RATE: 95 BPM | DIASTOLIC BLOOD PRESSURE: 85 MMHG | HEIGHT: 67 IN | BODY MASS INDEX: 34.16 KG/M2 | SYSTOLIC BLOOD PRESSURE: 124 MMHG

## 2021-02-10 DIAGNOSIS — G50.0 TRIGEMINAL NEURALGIA: ICD-10-CM

## 2021-02-10 DIAGNOSIS — G43.509 PERSISTENT MIGRAINE AURA WITHOUT CEREBRAL INFARCTION AND WITHOUT STATUS MIGRAINOSUS, NOT INTRACTABLE: Primary | ICD-10-CM

## 2021-02-10 PROCEDURE — 99999 PR PBB SHADOW E&M-EST. PATIENT-LVL III: ICD-10-PCS | Mod: PBBFAC,,, | Performed by: NEUROMUSCULOSKELETAL MEDICINE & OMM

## 2021-02-10 PROCEDURE — 99213 OFFICE O/P EST LOW 20 MIN: CPT | Mod: PBBFAC,PO | Performed by: NEUROMUSCULOSKELETAL MEDICINE & OMM

## 2021-02-10 PROCEDURE — 99999 PR PBB SHADOW E&M-EST. PATIENT-LVL III: CPT | Mod: PBBFAC,,, | Performed by: NEUROMUSCULOSKELETAL MEDICINE & OMM

## 2021-02-10 PROCEDURE — 99214 OFFICE O/P EST MOD 30 MIN: CPT | Mod: S$PBB,,, | Performed by: NEUROMUSCULOSKELETAL MEDICINE & OMM

## 2021-02-10 PROCEDURE — 99214 PR OFFICE/OUTPT VISIT, EST, LEVL IV, 30-39 MIN: ICD-10-PCS | Mod: S$PBB,,, | Performed by: NEUROMUSCULOSKELETAL MEDICINE & OMM

## 2021-02-10 RX ORDER — ONDANSETRON 4 MG/1
4 TABLET, ORALLY DISINTEGRATING ORAL EVERY 6 HOURS PRN
Qty: 12 TABLET | Refills: 0 | Status: SHIPPED | OUTPATIENT
Start: 2021-02-10 | End: 2022-08-10 | Stop reason: SDUPTHER

## 2021-02-10 RX ORDER — HYDROCODONE BITARTRATE AND ACETAMINOPHEN 5; 325 MG/1; MG/1
TABLET ORAL
COMMUNITY
Start: 2020-11-12 | End: 2021-08-28

## 2021-03-15 ENCOUNTER — PATIENT MESSAGE (OUTPATIENT)
Dept: NEUROLOGY | Facility: CLINIC | Age: 36
End: 2021-03-15

## 2021-03-15 DIAGNOSIS — Z86.69 HISTORY OF TRIGEMINAL NEURALGIA: ICD-10-CM

## 2021-03-17 RX ORDER — GABAPENTIN 400 MG/1
800 CAPSULE ORAL 4 TIMES DAILY
Qty: 240 CAPSULE | Refills: 3 | Status: SHIPPED | OUTPATIENT
Start: 2021-03-17 | End: 2021-05-20 | Stop reason: SDUPTHER

## 2021-05-20 ENCOUNTER — PATIENT MESSAGE (OUTPATIENT)
Dept: NEUROLOGY | Facility: CLINIC | Age: 36
End: 2021-05-20

## 2021-05-20 DIAGNOSIS — Z86.69 HISTORY OF TRIGEMINAL NEURALGIA: ICD-10-CM

## 2021-05-20 RX ORDER — GABAPENTIN 400 MG/1
800 CAPSULE ORAL 4 TIMES DAILY
Qty: 240 CAPSULE | Refills: 3 | Status: SHIPPED | OUTPATIENT
Start: 2021-05-20 | End: 2021-06-19

## 2021-06-03 ENCOUNTER — PATIENT MESSAGE (OUTPATIENT)
Dept: NEUROLOGY | Facility: CLINIC | Age: 36
End: 2021-06-03

## 2021-06-10 ENCOUNTER — TELEPHONE (OUTPATIENT)
Dept: NEUROLOGY | Facility: CLINIC | Age: 36
End: 2021-06-10

## 2021-06-10 ENCOUNTER — PATIENT MESSAGE (OUTPATIENT)
Dept: NEUROLOGY | Facility: CLINIC | Age: 36
End: 2021-06-10

## 2021-06-11 ENCOUNTER — PATIENT MESSAGE (OUTPATIENT)
Dept: NEUROLOGY | Facility: CLINIC | Age: 36
End: 2021-06-11

## 2021-06-11 RX ORDER — GABAPENTIN 300 MG/1
300 CAPSULE ORAL 3 TIMES DAILY
Qty: 90 CAPSULE | Refills: 11 | Status: SHIPPED | OUTPATIENT
Start: 2021-06-11 | End: 2021-06-16 | Stop reason: SDUPTHER

## 2021-06-16 RX ORDER — GABAPENTIN 300 MG/1
300 CAPSULE ORAL
Qty: 360 CAPSULE | Refills: 4 | Status: SHIPPED | OUTPATIENT
Start: 2021-06-16 | End: 2021-10-27

## 2021-08-27 ENCOUNTER — OFFICE VISIT (OUTPATIENT)
Dept: PRIMARY CARE CLINIC | Facility: CLINIC | Age: 36
End: 2021-08-27
Payer: MEDICAID

## 2021-08-27 DIAGNOSIS — J01.01 ACUTE RECURRENT MAXILLARY SINUSITIS: Primary | ICD-10-CM

## 2021-08-27 PROCEDURE — 99213 OFFICE O/P EST LOW 20 MIN: CPT | Mod: 95,,, | Performed by: NURSE PRACTITIONER

## 2021-08-27 PROCEDURE — 99213 PR OFFICE/OUTPT VISIT, EST, LEVL III, 20-29 MIN: ICD-10-PCS | Mod: 95,,, | Performed by: NURSE PRACTITIONER

## 2021-08-27 RX ORDER — AMOXICILLIN 500 MG/1
500 CAPSULE ORAL EVERY 12 HOURS
Qty: 14 CAPSULE | Refills: 0 | Status: SHIPPED | OUTPATIENT
Start: 2021-08-27 | End: 2021-09-03

## 2021-08-27 RX ORDER — FLUTICASONE PROPIONATE 50 MCG
1 SPRAY, SUSPENSION (ML) NASAL DAILY
Qty: 11.1 ML | Refills: 0 | Status: SHIPPED | OUTPATIENT
Start: 2021-08-27

## 2021-08-27 RX ORDER — AZELASTINE 1 MG/ML
1 SPRAY, METERED NASAL 2 TIMES DAILY
Qty: 30 ML | Refills: 3 | Status: SHIPPED | OUTPATIENT
Start: 2021-08-27 | End: 2023-06-20

## 2021-08-27 RX ORDER — CETIRIZINE HYDROCHLORIDE 10 MG/1
10 TABLET ORAL NIGHTLY
Qty: 30 TABLET | Refills: 11 | Status: SHIPPED | OUTPATIENT
Start: 2021-08-27 | End: 2023-06-20

## 2021-09-03 ENCOUNTER — PATIENT MESSAGE (OUTPATIENT)
Dept: NEUROLOGY | Facility: CLINIC | Age: 36
End: 2021-09-03

## 2021-10-25 ENCOUNTER — TELEPHONE (OUTPATIENT)
Dept: NEUROLOGY | Facility: CLINIC | Age: 36
End: 2021-10-25
Payer: MEDICAID

## 2021-10-27 ENCOUNTER — PATIENT MESSAGE (OUTPATIENT)
Dept: NEUROLOGY | Facility: CLINIC | Age: 36
End: 2021-10-27
Payer: MEDICAID

## 2021-10-27 DIAGNOSIS — G50.0 TRIGEMINAL NEURALGIA: Primary | ICD-10-CM

## 2021-10-27 RX ORDER — GABAPENTIN 300 MG/1
CAPSULE ORAL
Qty: 360 CAPSULE | Refills: 4 | Status: SHIPPED | OUTPATIENT
Start: 2021-10-27 | End: 2021-11-08 | Stop reason: SDUPTHER

## 2021-11-08 ENCOUNTER — PATIENT MESSAGE (OUTPATIENT)
Dept: NEUROLOGY | Facility: CLINIC | Age: 36
End: 2021-11-08
Payer: MEDICAID

## 2021-11-08 DIAGNOSIS — G50.0 TRIGEMINAL NEURALGIA: ICD-10-CM

## 2021-11-10 ENCOUNTER — PATIENT MESSAGE (OUTPATIENT)
Dept: NEUROLOGY | Facility: CLINIC | Age: 36
End: 2021-11-10
Payer: MEDICAID

## 2021-11-10 RX ORDER — GABAPENTIN 300 MG/1
CAPSULE ORAL
Qty: 360 CAPSULE | Refills: 4 | Status: SHIPPED | OUTPATIENT
Start: 2021-11-10 | End: 2022-03-08 | Stop reason: SDUPTHER

## 2021-11-22 ENCOUNTER — PATIENT MESSAGE (OUTPATIENT)
Dept: NEUROLOGY | Facility: CLINIC | Age: 36
End: 2021-11-22
Payer: MEDICAID

## 2021-11-22 DIAGNOSIS — Z86.69 H/O MIGRAINE WITH AURA: ICD-10-CM

## 2021-11-22 DIAGNOSIS — Z76.0 MEDICATION REFILL: ICD-10-CM

## 2021-11-24 RX ORDER — SUMATRIPTAN SUCCINATE 100 MG/1
100 TABLET ORAL
Qty: 10 TABLET | Refills: 2 | Status: SHIPPED | OUTPATIENT
Start: 2021-11-24 | End: 2022-08-11

## 2021-12-30 ENCOUNTER — OFFICE VISIT (OUTPATIENT)
Dept: NEUROLOGY | Facility: CLINIC | Age: 36
End: 2021-12-30
Payer: MEDICAID

## 2021-12-30 VITALS — WEIGHT: 202.19 LBS | BODY MASS INDEX: 31.66 KG/M2

## 2021-12-30 DIAGNOSIS — G43.509 PERSISTENT MIGRAINE AURA WITHOUT CEREBRAL INFARCTION AND WITHOUT STATUS MIGRAINOSUS, NOT INTRACTABLE: Primary | ICD-10-CM

## 2021-12-30 PROCEDURE — 99999 PR PBB SHADOW E&M-EST. PATIENT-LVL III: ICD-10-PCS | Mod: PBBFAC,,, | Performed by: NEUROMUSCULOSKELETAL MEDICINE & OMM

## 2021-12-30 PROCEDURE — 99213 OFFICE O/P EST LOW 20 MIN: CPT | Mod: PBBFAC,PO | Performed by: NEUROMUSCULOSKELETAL MEDICINE & OMM

## 2021-12-30 PROCEDURE — 1159F MED LIST DOCD IN RCRD: CPT | Mod: CPTII,,, | Performed by: NEUROMUSCULOSKELETAL MEDICINE & OMM

## 2021-12-30 PROCEDURE — 3008F BODY MASS INDEX DOCD: CPT | Mod: CPTII,,, | Performed by: NEUROMUSCULOSKELETAL MEDICINE & OMM

## 2021-12-30 PROCEDURE — 1159F PR MEDICATION LIST DOCUMENTED IN MEDICAL RECORD: ICD-10-PCS | Mod: CPTII,,, | Performed by: NEUROMUSCULOSKELETAL MEDICINE & OMM

## 2021-12-30 PROCEDURE — 3008F PR BODY MASS INDEX (BMI) DOCUMENTED: ICD-10-PCS | Mod: CPTII,,, | Performed by: NEUROMUSCULOSKELETAL MEDICINE & OMM

## 2021-12-30 PROCEDURE — 99999 PR PBB SHADOW E&M-EST. PATIENT-LVL III: CPT | Mod: PBBFAC,,, | Performed by: NEUROMUSCULOSKELETAL MEDICINE & OMM

## 2021-12-30 PROCEDURE — 99214 OFFICE O/P EST MOD 30 MIN: CPT | Mod: S$PBB,,, | Performed by: NEUROMUSCULOSKELETAL MEDICINE & OMM

## 2021-12-30 PROCEDURE — 99214 PR OFFICE/OUTPT VISIT, EST, LEVL IV, 30-39 MIN: ICD-10-PCS | Mod: S$PBB,,, | Performed by: NEUROMUSCULOSKELETAL MEDICINE & OMM

## 2021-12-30 RX ORDER — ONDANSETRON 4 MG/1
4 TABLET, FILM COATED ORAL
Status: DISCONTINUED | OUTPATIENT
Start: 2021-12-30 | End: 2023-06-20

## 2021-12-30 NOTE — PROGRESS NOTES
Present history:  Patient presents for follow-up for her headaches/migraines and trigeminal neuralgia.  She knows that she was having more headaches particularly with weather changes however changing her diet and eliminating dairy and not seem to help quite a bit.  She continues to take Imitrex 100 mg p.r.n. migraine which is typically now once per week.  She takes Zofran for the nausea with the headache.    She also has been taking gabapentin 2 tablets 6 times per day for her trigeminal neuralgia on the right.  The weather definitely aggravates her trigeminal neuralgia however this seems to be getting better as well.  The pain for the neuralgia is down the right side of the face was the migraines are more behind the eye.    Previous note:  2-10-21  f/u for migraine and trigeminal neuralgia. Neuralgia better with increase dose of Gabapentin to 400 mg 6-8' day. Weather frontnts trigger migraines/. Uses Zofran for[facility]oleusea,,  Previous note 8-    This is a 35-year-old white female presents with a history of trigeminal neuralgia diagnosed 10 years ago while in [place].  The pain typically starts in the right ear and radiates down the face into the teeth.  She describes an electrical pain which is intermittent and sometimes occurs every hour or constantly.  Pain intensity is typically 5-6/10 and worse at night as well as worse with fatigue.  She she had been on gabapentin in the past which seemed to work well.  She has had no trouble for awhile up until recently about 2 weeks ago when she started having some dental work.  Chewing and extreme temperatures of hot or cold seem to affect the neuralgia.  She denies any numbness on the face.  She has been taking gabapentin 300 mg t.i.d. and recently was increased to 600 mg at night.  The the gabapentin typically will kick in at about 1-1-1/2 hours in wears off in 3-4 hours.     Patient has a 2nd problem migraine headaches.  She gets a central vision aura lasting 20-30 min.   The milk or ice cream typically triggers the headaches.  She has been having migraines since she was 12 years old.  Imitrex will typically stop the headache if she takes at the onset of the aura.      Neurological Exam:  Mental status-alert and oriented to person, place, and time; attention span and concentration is good. Fund of knowledge-patient is aware of current events and able to give detailed history of the current problem.recent and remote memory seems intact. Language function is normal with no evidence of aphasia  Cranial nerves:Visual acuity to hand chart -normal; visual fields to confrontation normal;pupils were equal and reactive to light ;no evidence of ptosis ;  funduscopic examination was normal with sharp disc margins. external ocular movements were full with no nystagmus. Facial sensation to pinprick : normal ; corneal reflexes intact; Facial muscles were symmetrical. Hearing is unimpaired symmetrical finger rub; Tongue movements - normal ; palate movements - normal ;Swallowing unimpaired. Shoulder shrug was intact with good strength Speech was normal  Motor examination: Upper : normal                                      Lower extremities - Normal;muscle tone was normal ;                  Right-handed  Sensory examination:   Upper; normal pinprick and soft touch ;   Lower extremities - normal and symmetrical.   Vibration sense: 15-20 seconds @ toes  Deep tendon reflexes: upper extremities :1-2+ symmetrical ;     lower extremities KJ- 1-2 +; AJ - 1-2+ Both plantar responses were flexor  Cerebellar examination upper: Normal finger to nose and rapid alternating movements  Gait: Steady with no ataxia;      heel and toe walk normal  Romberg test: negative       Tandem gait: Normal    Involuntary movements: Negative  TMJ - no tenderness  Cervical examination: Full range of motion with no pain Cervical tenderness :negative  Lumbar examination: Low back tenderness-negative                  Sciatic  notchtenderness-negative            Straight leg raising : negative     Impression:  Right trigeminal neuralgia; migraine with aura.     Recommendations/Plan :  \  Cont Gabapentin for neuralgia 6-8 pills /day;Imitrex for migraine; Zofran 4 mg for nausea  F/u 6 [facility]

## 2022-01-20 ENCOUNTER — PATIENT MESSAGE (OUTPATIENT)
Dept: NEUROLOGY | Facility: CLINIC | Age: 37
End: 2022-01-20
Payer: MEDICAID

## 2022-03-08 ENCOUNTER — PATIENT MESSAGE (OUTPATIENT)
Dept: NEUROLOGY | Facility: CLINIC | Age: 37
End: 2022-03-08
Payer: MEDICAID

## 2022-03-08 DIAGNOSIS — G50.0 TRIGEMINAL NEURALGIA: ICD-10-CM

## 2022-03-09 RX ORDER — GABAPENTIN 300 MG/1
CAPSULE ORAL
Qty: 360 CAPSULE | Refills: 4 | Status: SHIPPED | OUTPATIENT
Start: 2022-03-09 | End: 2022-07-27 | Stop reason: SDUPTHER

## 2022-03-23 ENCOUNTER — PATIENT MESSAGE (OUTPATIENT)
Dept: NEUROLOGY | Facility: CLINIC | Age: 37
End: 2022-03-23
Payer: MEDICAID

## 2022-03-23 ENCOUNTER — TELEPHONE (OUTPATIENT)
Dept: NEUROLOGY | Facility: CLINIC | Age: 37
End: 2022-03-23
Payer: MEDICAID

## 2022-03-23 NOTE — TELEPHONE ENCOUNTER
Notified by pharmacist at [facility] pt is requesting a sooner refill due to losing neurontin in the last couple of days. This is the second time pt has lost prescription. Denied early refill request. Pt able to get refill on 03/27/22.

## 2022-07-27 ENCOUNTER — PATIENT MESSAGE (OUTPATIENT)
Dept: NEUROLOGY | Facility: CLINIC | Age: 37
End: 2022-07-27
Payer: MEDICAID

## 2022-08-10 DIAGNOSIS — Z86.69 H/O MIGRAINE WITH AURA: ICD-10-CM

## 2022-08-10 DIAGNOSIS — G43.509 PERSISTENT MIGRAINE AURA WITHOUT CEREBRAL INFARCTION AND WITHOUT STATUS MIGRAINOSUS, NOT INTRACTABLE: Primary | ICD-10-CM

## 2022-08-10 DIAGNOSIS — Z76.0 MEDICATION REFILL: ICD-10-CM

## 2022-08-10 RX ORDER — ONDANSETRON 4 MG/1
4 TABLET, ORALLY DISINTEGRATING ORAL EVERY 6 HOURS PRN
Qty: 12 TABLET | Refills: 0 | Status: SHIPPED | OUTPATIENT
Start: 2022-08-10 | End: 2022-08-11 | Stop reason: SDUPTHER

## 2022-08-11 ENCOUNTER — PATIENT MESSAGE (OUTPATIENT)
Dept: NEUROLOGY | Facility: CLINIC | Age: 37
End: 2022-08-11
Payer: MEDICAID

## 2022-08-11 DIAGNOSIS — Z86.69 H/O MIGRAINE WITH AURA: ICD-10-CM

## 2022-08-11 DIAGNOSIS — G50.0 TRIGEMINAL NEURALGIA: Primary | ICD-10-CM

## 2022-08-11 DIAGNOSIS — G43.509 PERSISTENT MIGRAINE AURA WITHOUT CEREBRAL INFARCTION AND WITHOUT STATUS MIGRAINOSUS, NOT INTRACTABLE: ICD-10-CM

## 2022-08-11 RX ORDER — SUMATRIPTAN SUCCINATE 100 MG/1
100 TABLET ORAL
Qty: 10 TABLET | Refills: 2 | Status: SHIPPED | OUTPATIENT
Start: 2022-08-11 | End: 2023-06-20 | Stop reason: SDUPTHER

## 2022-08-11 RX ORDER — ONDANSETRON 4 MG/1
4 TABLET, ORALLY DISINTEGRATING ORAL EVERY 6 HOURS PRN
Qty: 30 TABLET | Refills: 0 | Status: SHIPPED | OUTPATIENT
Start: 2022-08-11 | End: 2023-06-20 | Stop reason: SDUPTHER

## 2022-08-11 RX ORDER — ONDANSETRON 4 MG/1
4 TABLET, ORALLY DISINTEGRATING ORAL EVERY 6 HOURS PRN
Qty: 12 TABLET | Refills: 0 | Status: SHIPPED | OUTPATIENT
Start: 2022-08-11 | End: 2023-06-20

## 2022-08-11 RX ORDER — ONDANSETRON 4 MG/1
TABLET, ORALLY DISINTEGRATING ORAL
Qty: 12 TABLET | Refills: 0 | Status: SHIPPED | OUTPATIENT
Start: 2022-08-11 | End: 2023-06-20

## 2022-08-11 RX ORDER — SUMATRIPTAN SUCCINATE 100 MG/1
100 TABLET ORAL
Qty: 10 TABLET | Refills: 3 | Status: CANCELLED | OUTPATIENT
Start: 2022-08-11

## 2023-04-21 DIAGNOSIS — G50.0 TRIGEMINAL NEURALGIA: ICD-10-CM

## 2023-04-25 ENCOUNTER — TELEPHONE (OUTPATIENT)
Dept: NEUROLOGY | Facility: CLINIC | Age: 38
End: 2023-04-25
Payer: MEDICAID

## 2023-04-25 ENCOUNTER — PATIENT MESSAGE (OUTPATIENT)
Dept: NEUROLOGY | Facility: CLINIC | Age: 38
End: 2023-04-25
Payer: MEDICAID

## 2023-04-25 NOTE — TELEPHONE ENCOUNTER
Called Pt to reschedule her appt since it was scheduled incorrectly. I was not able to talk to her nor was I able to leave a message. I have sent Pt a message through the portal.

## 2023-06-08 RX ORDER — GABAPENTIN 300 MG/1
CAPSULE ORAL
Qty: 360 CAPSULE | Refills: 5 | OUTPATIENT
Start: 2023-06-08

## 2023-06-20 ENCOUNTER — OFFICE VISIT (OUTPATIENT)
Dept: NEUROLOGY | Facility: CLINIC | Age: 38
End: 2023-06-20
Payer: MEDICAID

## 2023-06-20 ENCOUNTER — PATIENT MESSAGE (OUTPATIENT)
Dept: NEUROLOGY | Facility: CLINIC | Age: 38
End: 2023-06-20

## 2023-06-20 VITALS — DIASTOLIC BLOOD PRESSURE: 83 MMHG | SYSTOLIC BLOOD PRESSURE: 118 MMHG | HEART RATE: 93 BPM

## 2023-06-20 DIAGNOSIS — R20.0 NUMBNESS IN LEFT LEG: ICD-10-CM

## 2023-06-20 DIAGNOSIS — Z86.69 H/O MIGRAINE WITH AURA: ICD-10-CM

## 2023-06-20 DIAGNOSIS — F34.89 OTHER SPECIFIED PERSISTENT MOOD DISORDERS: ICD-10-CM

## 2023-06-20 DIAGNOSIS — G37.9 DEMYELINATING DISEASE OF CENTRAL NERVOUS SYSTEM, UNSPECIFIED: ICD-10-CM

## 2023-06-20 DIAGNOSIS — Z76.0 MEDICATION REFILL: ICD-10-CM

## 2023-06-20 DIAGNOSIS — G50.0 TRIGEMINAL NEURALGIA: Primary | ICD-10-CM

## 2023-06-20 DIAGNOSIS — R20.2 NUMBNESS AND TINGLING IN BOTH HANDS: ICD-10-CM

## 2023-06-20 DIAGNOSIS — G43.109 MIGRAINE WITH AURA AND WITHOUT STATUS MIGRAINOSUS, NOT INTRACTABLE: ICD-10-CM

## 2023-06-20 DIAGNOSIS — R20.0 NUMBNESS AND TINGLING IN BOTH HANDS: ICD-10-CM

## 2023-06-20 PROCEDURE — 99999 PR PBB SHADOW E&M-EST. PATIENT-LVL III: ICD-10-PCS | Mod: PBBFAC,,, | Performed by: PSYCHIATRY & NEUROLOGY

## 2023-06-20 PROCEDURE — 3074F PR MOST RECENT SYSTOLIC BLOOD PRESSURE < 130 MM HG: ICD-10-PCS | Mod: CPTII,,, | Performed by: PSYCHIATRY & NEUROLOGY

## 2023-06-20 PROCEDURE — 99213 OFFICE O/P EST LOW 20 MIN: CPT | Mod: PBBFAC | Performed by: PSYCHIATRY & NEUROLOGY

## 2023-06-20 PROCEDURE — 99215 PR OFFICE/OUTPT VISIT, EST, LEVL V, 40-54 MIN: ICD-10-PCS | Mod: S$PBB,,, | Performed by: PSYCHIATRY & NEUROLOGY

## 2023-06-20 PROCEDURE — 3079F DIAST BP 80-89 MM HG: CPT | Mod: CPTII,,, | Performed by: PSYCHIATRY & NEUROLOGY

## 2023-06-20 PROCEDURE — 1160F PR REVIEW ALL MEDS BY PRESCRIBER/CLIN PHARMACIST DOCUMENTED: ICD-10-PCS | Mod: CPTII,,, | Performed by: PSYCHIATRY & NEUROLOGY

## 2023-06-20 PROCEDURE — 1159F PR MEDICATION LIST DOCUMENTED IN MEDICAL RECORD: ICD-10-PCS | Mod: CPTII,,, | Performed by: PSYCHIATRY & NEUROLOGY

## 2023-06-20 PROCEDURE — 99999 PR PBB SHADOW E&M-EST. PATIENT-LVL III: CPT | Mod: PBBFAC,,, | Performed by: PSYCHIATRY & NEUROLOGY

## 2023-06-20 PROCEDURE — 99215 OFFICE O/P EST HI 40 MIN: CPT | Mod: S$PBB,,, | Performed by: PSYCHIATRY & NEUROLOGY

## 2023-06-20 PROCEDURE — 3074F SYST BP LT 130 MM HG: CPT | Mod: CPTII,,, | Performed by: PSYCHIATRY & NEUROLOGY

## 2023-06-20 PROCEDURE — 1160F RVW MEDS BY RX/DR IN RCRD: CPT | Mod: CPTII,,, | Performed by: PSYCHIATRY & NEUROLOGY

## 2023-06-20 PROCEDURE — 1159F MED LIST DOCD IN RCRD: CPT | Mod: CPTII,,, | Performed by: PSYCHIATRY & NEUROLOGY

## 2023-06-20 PROCEDURE — 3079F PR MOST RECENT DIASTOLIC BLOOD PRESSURE 80-89 MM HG: ICD-10-PCS | Mod: CPTII,,, | Performed by: PSYCHIATRY & NEUROLOGY

## 2023-06-20 RX ORDER — SUMATRIPTAN SUCCINATE 100 MG/1
TABLET ORAL
Qty: 9 TABLET | Refills: 2 | Status: SHIPPED | OUTPATIENT
Start: 2023-06-20 | End: 2024-01-22 | Stop reason: SDUPTHER

## 2023-06-20 RX ORDER — GABAPENTIN 300 MG/1
600 CAPSULE ORAL 3 TIMES DAILY
Qty: 360 CAPSULE | Refills: 5 | Status: SHIPPED | OUTPATIENT
Start: 2023-06-20 | End: 2023-09-05 | Stop reason: SDUPTHER

## 2023-06-20 RX ORDER — ONDANSETRON 4 MG/1
4 TABLET, ORALLY DISINTEGRATING ORAL EVERY 6 HOURS PRN
Qty: 21 TABLET | Refills: 2 | Status: SHIPPED | OUTPATIENT
Start: 2023-06-20 | End: 2024-01-22 | Stop reason: SDUPTHER

## 2023-06-20 RX ORDER — GABAPENTIN 300 MG/1
600 CAPSULE ORAL 3 TIMES DAILY
Qty: 360 CAPSULE | Refills: 5 | Status: SHIPPED | OUTPATIENT
Start: 2023-06-20 | End: 2023-06-20

## 2023-06-20 NOTE — PATIENT INSTRUCTIONS
Increase gabapentin to 600 mg (2 capsules) three times a day. If no side effects and need better pain control, please contact me as I can increase it every 3 days  Continue sumatriptan 100 mg with headache onset. May take 2nd dose 2 hours later. Do not take more than 2 tabs in 24 hours.  Continue Zofran 4 mg every 6 hours as needed  MRI Brain with and without contrast with thin cuts thru trigeminal nerves  MRI Cervical spine without contrast  Counseled on not to use estrogen based medications due to having migraines with aura and as a result are at increased risk for blood clots. Counseled also that migraines with aura in combination of tobacco use also increases risks for blood clots  Counseled on smoking cessation  Continue to follow with counselor
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
## 2023-06-21 ENCOUNTER — TELEPHONE (OUTPATIENT)
Dept: NEUROLOGY | Facility: CLINIC | Age: 38
End: 2023-06-21
Payer: MEDICAID

## 2023-06-21 ENCOUNTER — PATIENT MESSAGE (OUTPATIENT)
Dept: PRIMARY CARE CLINIC | Facility: CLINIC | Age: 38
End: 2023-06-21
Payer: MEDICAID

## 2023-06-21 NOTE — TELEPHONE ENCOUNTER
"Outgoing call to [id number] - [place] - [address] (Ph: [phone]) and spoke to Rosa. Informed was giving a call back for Reggie to provide clarification on gabapentin rx. Pharmacy needed clarification on directions. Informed pharmacy directions state "Take 2 capsules (600 mg total) by mouth 3 (three) times daily". Pharmacy confirmed and verbalized understanding. No further assistance needed.   "

## 2023-06-21 NOTE — TELEPHONE ENCOUNTER
----- Message from Anneliese Scott MA sent at 6/20/2023 11:24 AM CDT -----  Regarding: FW: Clarification  Contact: Reggie @ [phone]    ----- Message -----  From: Rachelle Snyder  Sent: 6/20/2023  11:23 AM CDT  To: P[facility] Irene Staff  Subject: Clarification                                    Reggie from [facility] is calling to get clarification on gabapentin (NEURONTIN) 300 MG capsule. Asking for a call back

## 2023-06-29 ENCOUNTER — PATIENT MESSAGE (OUTPATIENT)
Dept: RADIOLOGY | Facility: HOSPITAL | Age: 38
End: 2023-06-29
Payer: MEDICAID

## 2023-09-02 ENCOUNTER — PATIENT MESSAGE (OUTPATIENT)
Dept: NEUROLOGY | Facility: CLINIC | Age: 38
End: 2023-09-02
Payer: MEDICAID

## 2023-09-05 RX ORDER — GABAPENTIN 300 MG/1
CAPSULE ORAL
Qty: 210 CAPSULE | Refills: 5 | Status: SHIPPED | OUTPATIENT
Start: 2023-09-05 | End: 2024-01-17 | Stop reason: SDUPTHER

## 2023-09-24 ENCOUNTER — PATIENT MESSAGE (OUTPATIENT)
Dept: ADMINISTRATIVE | Facility: OTHER | Age: 38
End: 2023-09-24
Payer: MEDICAID

## 2024-01-11 ENCOUNTER — TELEPHONE (OUTPATIENT)
Dept: NEUROLOGY | Facility: CLINIC | Age: 39
End: 2024-01-11
Payer: MEDICAID

## 2024-01-11 NOTE — TELEPHONE ENCOUNTER
Spoke to Pt about scheduling a follow up appt. Pt will be scheduled for Jan 17 at [place]. Pt was informed about the location twice and informed of the 10 min maria teresa period.     ----- Message from Teto Herbert MD sent at 1/11/2024 12:01 PM CST -----  Regarding: RE: Medication Increase  Contact: pt.  [phone]  Please advise I will not make any further medication adjustments until she follows up given she has no showed for planned follow ups after medication adjustments. Thanks!  ----- Message -----  From: Anneliese Scott MA  Sent: 1/11/2024   9:44 AM CST  To: Teto Herbert MD  Subject: FW: Medication Increase                            ----- Message -----  From: Marva Whalen  Sent: 1/11/2024   9:41 AM CST  To: [facility] Irene Staff  Subject: Medication Increase                              Pt is calling in ref to medication gabapentin (NEURONTIN) 300 MG capsule. She is asking for an increase in dosage says she is experiencing increased pain. Pt would like to try maybe one more pill in the morning. Patient Requesting Call Back @  [phone]      [id number] - [place] - [id number]  [place] [id number]  Phone: [phone] Fax: [phone]

## 2024-01-16 ENCOUNTER — TELEPHONE (OUTPATIENT)
Dept: NEUROLOGY | Facility: CLINIC | Age: 39
End: 2024-01-16
Payer: MEDICAID

## 2024-01-16 NOTE — TELEPHONE ENCOUNTER
Spoke to Pt about switching her in person appt to virtual tomorrow due to the weather. Pt accepted to have a virtual visit tomorrow.

## 2024-01-17 ENCOUNTER — OFFICE VISIT (OUTPATIENT)
Dept: NEUROLOGY | Facility: CLINIC | Age: 39
End: 2024-01-17
Payer: MEDICAID

## 2024-01-17 DIAGNOSIS — G43.109 MIGRAINE WITH AURA AND WITHOUT STATUS MIGRAINOSUS, NOT INTRACTABLE: Primary | ICD-10-CM

## 2024-01-17 DIAGNOSIS — G50.0 TRIGEMINAL NEURALGIA: ICD-10-CM

## 2024-01-17 DIAGNOSIS — G37.9 DEMYELINATING DISEASE OF CENTRAL NERVOUS SYSTEM, UNSPECIFIED: ICD-10-CM

## 2024-01-17 DIAGNOSIS — R11.0 NAUSEA: ICD-10-CM

## 2024-01-17 DIAGNOSIS — G44.86 CERVICOGENIC HEADACHE: ICD-10-CM

## 2024-01-17 DIAGNOSIS — Z51.81 MEDICATION MONITORING ENCOUNTER: ICD-10-CM

## 2024-01-17 PROCEDURE — 1159F MED LIST DOCD IN RCRD: CPT | Mod: CPTII,95,, | Performed by: PSYCHIATRY & NEUROLOGY

## 2024-01-17 PROCEDURE — 1160F RVW MEDS BY RX/DR IN RCRD: CPT | Mod: CPTII,95,, | Performed by: PSYCHIATRY & NEUROLOGY

## 2024-01-17 PROCEDURE — 99214 OFFICE O/P EST MOD 30 MIN: CPT | Mod: 95,,, | Performed by: PSYCHIATRY & NEUROLOGY

## 2024-01-17 RX ORDER — ESCITALOPRAM OXALATE 20 MG/1
20 TABLET, FILM COATED ORAL EVERY MORNING
COMMUNITY

## 2024-01-17 RX ORDER — GABAPENTIN 300 MG/1
900 CAPSULE ORAL 3 TIMES DAILY
Qty: 210 CAPSULE | Refills: 5 | Status: SHIPPED | OUTPATIENT
Start: 2024-01-17 | End: 2024-02-06 | Stop reason: SDUPTHER

## 2024-01-17 RX ORDER — GABAPENTIN 300 MG/1
CAPSULE ORAL
Qty: 210 CAPSULE | Refills: 5 | Status: SHIPPED | OUTPATIENT
Start: 2024-01-17 | End: 2024-01-17

## 2024-01-17 RX ORDER — OLANZAPINE 15 MG/1
15 TABLET ORAL NIGHTLY
COMMUNITY
Start: 2024-01-13

## 2024-01-17 RX ORDER — GABAPENTIN 300 MG/1
900 CAPSULE ORAL 3 TIMES DAILY
Qty: 210 CAPSULE | Refills: 5 | Status: SHIPPED | OUTPATIENT
Start: 2024-01-17 | End: 2024-01-17

## 2024-01-17 NOTE — PATIENT INSTRUCTIONS
Increase gabapentin to 900 mg (3 capsules) three times a day. If no side effects and need better pain control, please contact me as I can increase it every 3 days  Continue sumatriptan 100 mg with headache onset. May take 2nd dose 2 hours later. Do not take more than 2 tabs in 24 hours.  Continue Zofran 4 mg every 6 hours as needed  MRI Brain with and without contrast with thin cuts thru trigeminal nerves ordered previously  MRI Cervical spine without contrast ordered previously  Counseled on not to use estrogen based medications due to having migraines with aura and as a result are at increased risk for blood clots. Counseled also that migraines with aura in combination of tobacco use also increases risks for blood clots  Counseled on smoking cessation  Continue to follow with counselor

## 2024-01-17 NOTE — PROGRESS NOTES
"Patient verified their name and date of birth prior to the visit.    This is a telemedicine visit that was performed with the originating site at home in [place] and the distant site at Dr. Teto Herbert's office in [place]. Verbal consent to participate in video visit was obtained. I discussed with the patient the nature of our telemedicine visits, that:      I would evaluate the patient and recommend diagnostics and treatments based on my assessment    Our sessions are not being recorded and that personal health information is protected and is documented in the their electronic medical record    Our team would provide follow up care in person if/when the patient needs it    Chief Complaint: Headache    Subjective:     Referring Provider: Self Referral   Accompanied by: No one     06/20/2023: Ayla Edmond is a 38 y.o. female with h/o anxiety, depression, trigeminal neuralgia, migraine with aura who presents for headache evaluation. She has had headaches for as long as she can remember and worsened with puberty and "migraines" started with first menstrual cycle, and then worsened a year ago when moved from [place] to [place]. Currently, headaches are daily, 2 hours to all day, usually resolves with ibuprofen, behind eyes, sometimes occipital, bitemple, pressure, stabbing sometimes, bad headaches are 3 times a week. She takes 2-3 ibuprofen daily. She has identified triggers as milk, nuts, possible weather change, stress. She has high neck pain that she has noticed is correlated to headache and has been going on for 5 years. She has right sided facial pain but has been on the left side before and started at age 22 that leveled out once she got on gabapentin, stabbing/electric that started in cheek and went to upper and lower jaw/mouth and has progressed to involve behind her ear, 5-20 mins, triggered if weather really hot or cold or touching the area or hot/cold foods. For headaches, she has associated " "photophobia, vomiting for bad headaches, imbalance, aura described as objects disappearing in visual field for 30 minutes prior to headache onset. For headaches and face pain, she has associated phonophobia, nausea (only gets it with bad face pain). For headaches and face pain, denies associated weakness, numbness, tingling, spinning, lightheadedness, other changes in vision. She has had all of her finger tips going numb/tingling for 2 years that has worsened in last 6 months that is noticeable when driving or sitting straight up and in 1/2023 or 2/2023 she woke up with numbness from mid shin down to all of her toes and took 1 month to resolve. She denies other instances of numbness/tingling in life. She notes 3-4 instances in life where she has complete weakness on one side of her body when she wakes up causing her too fall and does not remember which side of body has been impacted or if each side has been impacted at different times. She sleeps okay and has some nights where she does not sleep well and wakes up tired sometimes. She snores and does not know if has apnea. She denies a positional component and vasal vagal maneuvers do not significantly worsen headaches. Headaches will wake her up and will wake up with headaches. She has a menstrual correlate to headaches. Mood is trying to stay happy and always has underlying depression and anxiety. She follows with a counselor. For headaches, she has tried ibuprofen, Fioricet (made her feel sick), tramadol, sumatriptan (makes her "migraines" resolve and unsure if it causes nausea so takes Zofran), Tylenol. For face pain, she has tried carbamazepine that caused multiple side effects and made her feel awful and drunk, Lyrica that was too expensive, gabapentin (denies side effects other than restless feeling if restarts it after having not taken it regularly). She is taking gabapentin 300 mg four times a day that she was given by a walk in clinic and was not given her " normal dose and her previous neurologist had her on 600 mg TID.     Per chart review, she has tried gabapentin, sumatriptan, Zofran     01/17/2024: Ayla Edmond is a 38 y.o. female with h/o anxiety, depression, trigeminal neuralgia, bipolar 1 disorder, migraine with aura who presents for follow up. On last visit, increased gabapentin, continued sumatriptan and Zofran, ordered MRIs brain and C-spine, counseled on estrogen based medications and smoking cessation, and to follow up with counselor. In the interim, increased gabapentin. Headaches are near daily, was waking up with them in the middle night, behind eyes, posterior neck, tension, pressure, 2 hours, improves with ibuprofen and Tylenol. Her migraines have been better that she gets at least one a month but none in the past month and are stabbing occipital pain but can be different locations is best description and can last up to 1 day but can be resolve with sleep and Imitrex. With her migraines, she has associated photophobia, phonophobia, aura where cannot see central vision for 30 mins prior to headache onset. She will have photophobia sometimes with other headaches and no phonophobia or aura with her other headaches. For all headaches, she denies associated weakness, numbness, tingling, dizziness, N/V, change in vision. She will have nausea sometimes not associated with headaches. She will have difficulties with far vision separate from the headaches. Her sleep fluctuates and wakes up not rested. Mood is good and has gotten off methadone so is feeling better and has noticed has helped her migraines as well since being off of it. The increased gabapentin helps and is taking 600 mg in the morning, 600 mg in the afternoon, and 900 mg at night and noticed taking an extra one in the morning one time provided further help and denies side effects. Sumatriptan helps and causes nausea maybe and then takes Zofran and denies side effects to Zofran. She is following  with her counselor. She notes since last visit she has been diagnosed with bipolar 1 and started on Lexapro and Zyprexa that are helping. She missed her appointment for MRI. She has reduced tobacco use.    Current Outpatient Medications on File Prior to Visit   Medication Sig Dispense Refill    ZYPREXA 15 mg Tab Take 15 mg by mouth every evening.      cetirizine (ZYRTEC) 10 MG tablet Take 1 tablet (10 mg total) by mouth every evening. 30 tablet 11    fluticasone propionate (FLONASE) 50 mcg/actuation nasal spray 1 spray (50 mcg total) by Each Nostril route once daily. 11.1 mL 0    gabapentin (NEURONTIN) 300 MG capsule Take 600 mg (2 caps) in the morning, 600 mg (2 caps) in the afternoon, 900 mg (3 caps) at night 210 capsule 5    LEXAPRO 20 mg tablet Take 20 mg by mouth every morning.      lidocaine HCL 4 % Gel Apply 1 application topically.      ondansetron (ZOFRAN-ODT) 4 MG TbDL Take 1 tablet (4 mg total) by mouth every 6 (six) hours as needed (nausea). 21 tablet 2    sumatriptan (IMITREX) 100 MG tablet Take one, repeat in 2 hours if still symptomatic.  Do not exceed 2 doses in 1 day 9 tablet 2     No current facility-administered medications on file prior to visit.       Review of patient's allergies indicates:   Allergen Reactions    Sulfamethoxazole-trimethoprim     Azithromycin Rash    Clarithromycin Nausea And Vomiting    Sulfa (sulfonamide antibiotics) Rash    Trimethoprim Hives and Nausea And Vomiting       Family History   Problem Relation Age of Onset    Hypertension Father     Cancer Maternal Grandmother         Lung Cancer    Migraines Maternal Grandfather        Social History     Tobacco Use    Smoking status: Every Day     Current packs/day: 0.50     Types: Cigarettes    Smokeless tobacco: Never   Substance Use Topics    Alcohol use: Not Currently    Drug use: Yes     Types: Marijuana       Review of Systems  Constitutional: No fevers, no chills, no change in weight  Eye/Vision: See  HPI  Ear/Nose/Mouth/Throat: See HPI; no cough, no runny nose, no sore throat  Respiratory: No shortness of breath, no problems breathing  Cardiovascular: No chest pain  Gastrointestinal: See HPI, no diarrhea, no constipation  Genitourinary: No dysuria  Musculoskeletal: See HPI  Integumentary: No skin changes  Neurologic: See HPI  Psychiatric: Denies depression, denies anxiety, denies SI and HI.    Objective:     There were no vitals filed for this visit.    General: Alert and awake, no acute distress, well groomed, well nourished  Eyes: Pupils are equal, round as best can be evaluated by camera; Extraocular movements are intact; Normal conjunctiva  HENT: Normocephalic, Oral mucosa is moist    Neurologic Exam  Mental Status: orientated to time, person, and place; No aphasia or dysarthria. Repetition intact. Follows complex commands    Cranial Nerves: as above, V1-V3 LT sensation WNL bilaterally per patient report when patient self performs, face symmetric, tongue protrusion midline and movements WNL    Muscle Tone/Motor Function: No drift. Normal rapidly alternating movements bilateral hands. No tremors. No abnormal movements per what is observable on camera    At least 4-/5 x4 per what is observable on camera    Sensory: LT WNL x4 per patient report when patient self performs    Reflexes: Unable to assess 2/2 limitations of telemedicine    Coordination: Finger to nose WNL bilaterally.    Gait: Gait WNL per what is observable on camera    Labs:    No new labs    Imaging:    No new studies    Assessment:       ICD-10-CM ICD-9-CM    1. Migraine with aura and without status migrainosus, not intractable  G43.109 346.00       2. Trigeminal neuralgia  G50.0 350.1       3. Cervicogenic headache  G44.86 784.0       4. Medication monitoring encounter  Z51.81 V58.83       5. Nausea  R11.0 787.02          38 y.o. female with h/o anxiety, depression, trigeminal neuralgia, migraine with aura who presents for follow up. Neurologic  exam limited by the use of telemedicine but no observed focal neurological deficits at this time and on initial exam, she has decreased temperature left foot, hyperreflexia throughout with positive left [place]. Headaches meet criteria for migraine with aura and criteria for prophylactic and abortive therapy. She is describing likely right sided trigeminal neuralgia but a little unusual that it can be left side. For numbness/tingling in fingers and one episode in left lower leg and episodes of transient weakness, concerning for a CNS process like demyelination so will get MRI Brain WOW and MRI C-Spine. Will also get the MRI Brain to evaluate trigeminal system and because she has headache red flag for increased intracranial pressure. We discussed previously [facility] free smoking cessation program and she is going to think about it. Discussed previously how mood can impact her pain. Overall, her migraines are improving and her daily headaches are tension vs cervicogenic headache based on description.    Plan:     Increase gabapentin to 900 mg (3 capsules) three times a day. If no side effects and need better pain control, please contact me as I can increase it every 3 days  Continue sumatriptan 100 mg with headache onset. May take 2nd dose 2 hours later. Do not take more than 2 tabs in 24 hours.  Continue Zofran 4 mg every 6 hours as needed  MRI Brain with and without contrast with thin cuts thru trigeminal nerves ordered previously  MRI Cervical spine without contrast ordered previously  Counseled on not to use estrogen based medications due to having migraines with aura and as a result are at increased risk for blood clots. Counseled also that migraines with aura in combination of tobacco use also increases risks for blood clots  Counseled on smoking cessation  Continue to follow with counselor    Teto Herbert MD  [facility]

## 2024-01-22 DIAGNOSIS — Z76.0 MEDICATION REFILL: ICD-10-CM

## 2024-01-22 DIAGNOSIS — Z86.69 H/O MIGRAINE WITH AURA: ICD-10-CM

## 2024-01-22 RX ORDER — ONDANSETRON 4 MG/1
4 TABLET, ORALLY DISINTEGRATING ORAL EVERY 6 HOURS PRN
Qty: 21 TABLET | Refills: 2 | Status: SHIPPED | OUTPATIENT
Start: 2024-01-22

## 2024-01-22 RX ORDER — SUMATRIPTAN SUCCINATE 100 MG/1
TABLET ORAL
Qty: 9 TABLET | Refills: 2 | Status: SHIPPED | OUTPATIENT
Start: 2024-01-22 | End: 2024-04-18 | Stop reason: SDUPTHER

## 2024-01-22 NOTE — TELEPHONE ENCOUNTER
Last ordered:06/20/23    Last seen:01/17/24  Upcoming appt:6 month recall       ----- Message from Hiwot Perez sent at 1/22/2024 10:37 AM CST -----  Regarding: refill  Can the clinic reply in [facility]:       Please refill the medication(s) listed below. Please call the patient when the prescription(s) is ready for  at this phone number   JACOB DAMON [[id number]] Telephone Information:  Mobile          [phone] pt is requesting for medication to be filled today is suffering with a headache and stomach issues. Please advise           Medication #1 sumatriptan (IMITREX) 100 MG tablet    Medication #2 ondansetron (ZOFRAN-ODT) 4 MG TbDL      Preferred Pharmacy:   [facility]/pharmacy #[id number] - [place] - [address]  [place] [id number]  Phone: [phone] Fax: [phone]

## 2024-02-05 ENCOUNTER — PATIENT MESSAGE (OUTPATIENT)
Dept: NEUROLOGY | Facility: CLINIC | Age: 39
End: 2024-02-05
Payer: MEDICAID

## 2024-02-06 RX ORDER — GABAPENTIN 300 MG/1
900 CAPSULE ORAL 3 TIMES DAILY
Qty: 270 CAPSULE | Refills: 5 | Status: SHIPPED | OUTPATIENT
Start: 2024-02-06 | End: 2024-04-18

## 2024-02-07 ENCOUNTER — HOSPITAL ENCOUNTER (OUTPATIENT)
Dept: RADIOLOGY | Facility: HOSPITAL | Age: 39
Discharge: HOME | End: 2024-02-07
Attending: PSYCHIATRY & NEUROLOGY
Payer: MEDICAID

## 2024-02-07 DIAGNOSIS — R20.0 NUMBNESS AND TINGLING IN BOTH HANDS: ICD-10-CM

## 2024-02-07 DIAGNOSIS — R20.2 NUMBNESS AND TINGLING IN BOTH HANDS: ICD-10-CM

## 2024-02-07 DIAGNOSIS — G37.9 DEMYELINATING DISEASE OF CENTRAL NERVOUS SYSTEM, UNSPECIFIED: ICD-10-CM

## 2024-02-07 DIAGNOSIS — R20.0 NUMBNESS IN LEFT LEG: ICD-10-CM

## 2024-02-07 PROCEDURE — 25500020 PHARM REV CODE 255: Mod: PO | Performed by: PSYCHIATRY & NEUROLOGY

## 2024-02-07 PROCEDURE — 72141 MRI NECK SPINE W/O DYE: CPT | Mod: TC,PO

## 2024-02-07 PROCEDURE — 70553 MRI BRAIN STEM W/O & W/DYE: CPT | Mod: TC,PO

## 2024-02-07 PROCEDURE — A9585 GADOBUTROL INJECTION: HCPCS | Mod: PO | Performed by: PSYCHIATRY & NEUROLOGY

## 2024-02-07 PROCEDURE — 70553 MRI BRAIN STEM W/O & W/DYE: CPT | Mod: 26,,, | Performed by: RADIOLOGY

## 2024-02-07 PROCEDURE — 72141 MRI NECK SPINE W/O DYE: CPT | Mod: 26,,, | Performed by: RADIOLOGY

## 2024-02-07 RX ORDER — GADOBUTROL 604.72 MG/ML
9 INJECTION INTRAVENOUS
Status: COMPLETED | OUTPATIENT
Start: 2024-02-07 | End: 2024-02-07

## 2024-02-07 RX ADMIN — GADOBUTROL 9 ML: 604.72 INJECTION INTRAVENOUS at 09:02

## 2024-02-13 ENCOUNTER — TELEPHONE (OUTPATIENT)
Dept: NEUROLOGY | Facility: CLINIC | Age: 39
End: 2024-02-13
Payer: MEDICAID

## 2024-02-15 ENCOUNTER — TELEPHONE (OUTPATIENT)
Dept: NEUROLOGY | Facility: CLINIC | Age: 39
End: 2024-02-15
Payer: MEDICAID

## 2024-02-15 NOTE — TELEPHONE ENCOUNTER
Called Pt to discuss MRI results. I was unable to speak with her but left a vm.     Provider's message - the MRI of your brain is normal with no signs of increased pressure. If you are okay with it, I would like to order an EMG/NCS to test the nerves in your extremities to see if there is an issue there that could be causing some of your symptoms. Please let me know your thoughts and if you have any questions or concerns.

## 2024-03-25 ENCOUNTER — TELEPHONE (OUTPATIENT)
Dept: NEUROLOGY | Facility: CLINIC | Age: 39
End: 2024-03-25
Payer: MEDICAID

## 2024-03-25 NOTE — TELEPHONE ENCOUNTER
Called Pt to schedule a follow up appt to discuss medication options. I was unable to speak with her but left a vm.       ----- Message from Teto Herbert MD sent at 3/25/2024  2:18 PM CDT -----  Regarding: RE: Alternative medication  Contact: [phone]  Please advise would need to do a follow up appointment if this is a medication change. Thanks!  ----- Message -----  From: Anneliese Scott MA  Sent: 3/25/2024   2:01 PM CDT  To: Teto Herbert MD  Subject: FW: Alternative medication                       Please advise. Thank you   ----- Message -----  From: Linus Parker  Sent: 3/25/2024   1:49 PM CDT  To: Piedad Irene Staff  Subject: Alternative medication                           Patient calling requesting a callback from nurse or provider in regards to getting gabapentin (NEURONTIN) 300 MG capsule changed to pre gabapentin due to program she is going through. Please call back as soon as possible.    [facility]  [address]  [phone]

## 2024-03-25 NOTE — TELEPHONE ENCOUNTER
Spoke to Pt. I tried scheduling a sooner appt. Pt stated she would like to keep her May appt.       ----- Message from London Neff sent at 3/25/2024  4:09 PM CDT -----  Regarding: Missed Call  Contact: [phone]  Pt calling to speak with someone in provider office regarding missed call. Please call pt back at [phone]. If pt do not answer please call [phone]

## 2024-04-15 ENCOUNTER — TELEPHONE (OUTPATIENT)
Dept: NEUROLOGY | Facility: CLINIC | Age: 39
End: 2024-04-15
Payer: MEDICAID

## 2024-04-15 NOTE — TELEPHONE ENCOUNTER
Called Pt but was unable to speak with her. [facility]     ----- Message from Lainey Montalvo sent at 4/15/2024 10:51 AM CDT -----  Contact: [phone]  Ayla Edmond calling regarding Patient Advice (message) Pt asking for a call back to see about changing her med back to the dose asking for a call back to discuss

## 2024-04-15 NOTE — TELEPHONE ENCOUNTER
Spoke to Pt. She was returning my call from earlier. Pt is stating she went to a treatment facility and they cut her Gabapentin down. Pt was taking 900 mg three times a day (Take 3 capsules (900 mg total) by mouth 3 times daily) and the treatment facility were only allowing her 900 mg once a day. Pt has been scheduled for a sooner appt. Pt will be scheduled for April 18 to discuss this issue in person with provider.

## 2024-04-16 ENCOUNTER — PATIENT MESSAGE (OUTPATIENT)
Dept: NEUROLOGY | Facility: CLINIC | Age: 39
End: 2024-04-16
Payer: MEDICAID

## 2024-04-16 ENCOUNTER — TELEPHONE (OUTPATIENT)
Dept: NEUROLOGY | Facility: CLINIC | Age: 39
End: 2024-04-16
Payer: MEDICAID

## 2024-04-16 NOTE — TELEPHONE ENCOUNTER
Pt was notified that her appt has been switched to virtual.     ----- Message from Teto Herbert MD sent at 4/16/2024 12:00 PM CDT -----  Regarding: RE: pt advice  Contact: [phone]  Virtual is fine. Thanks!  ----- Message -----  From: Anneliese Scott MA  Sent: 4/16/2024  11:43 AM CDT  To: Teto Herbert MD  Subject: FW: pt advice                                    Please advise. Thank you  ----- Message -----  From: Bonita Sparrow  Sent: 4/16/2024  11:36 AM CDT  To: Piedad Irene Staff  Subject: pt advice                                        Pt calling in regards to needing to see if appt 4/17/24 can be transfer to a viritual visit. Pls call

## 2024-04-18 ENCOUNTER — OFFICE VISIT (OUTPATIENT)
Dept: NEUROLOGY | Facility: CLINIC | Age: 39
End: 2024-04-18
Payer: MEDICAID

## 2024-04-18 ENCOUNTER — TELEPHONE (OUTPATIENT)
Dept: NEUROLOGY | Facility: CLINIC | Age: 39
End: 2024-04-18

## 2024-04-18 DIAGNOSIS — G50.0 TRIGEMINAL NEURALGIA OF RIGHT SIDE OF FACE: ICD-10-CM

## 2024-04-18 DIAGNOSIS — Z51.81 MEDICATION MONITORING ENCOUNTER: ICD-10-CM

## 2024-04-18 DIAGNOSIS — G43.109 MIGRAINE WITH AURA AND WITHOUT STATUS MIGRAINOSUS, NOT INTRACTABLE: Primary | ICD-10-CM

## 2024-04-18 PROCEDURE — 1160F RVW MEDS BY RX/DR IN RCRD: CPT | Mod: CPTII,95,, | Performed by: PSYCHIATRY & NEUROLOGY

## 2024-04-18 PROCEDURE — 99214 OFFICE O/P EST MOD 30 MIN: CPT | Mod: 95,,, | Performed by: PSYCHIATRY & NEUROLOGY

## 2024-04-18 PROCEDURE — 1159F MED LIST DOCD IN RCRD: CPT | Mod: CPTII,95,, | Performed by: PSYCHIATRY & NEUROLOGY

## 2024-04-18 RX ORDER — GABAPENTIN 600 MG/1
600 TABLET ORAL 3 TIMES DAILY
Qty: 90 TABLET | Refills: 11 | Status: SHIPPED | OUTPATIENT
Start: 2024-04-18 | End: 2024-05-28 | Stop reason: SDUPTHER

## 2024-04-18 RX ORDER — SUMATRIPTAN SUCCINATE 100 MG/1
TABLET ORAL
Qty: 9 TABLET | Refills: 2 | Status: SHIPPED | OUTPATIENT
Start: 2024-04-18

## 2024-04-18 NOTE — TELEPHONE ENCOUNTER
Spoke to Pt about scheduling an appt. Pt will be scheduled for a 3 month virtual follow up on July 17.       ----- Message from Paola Frost sent at 4/18/2024  4:00 PM CDT -----  Regarding: returning call  Contact: [phone]  JACOB DAMON calling regarding Appointment Access (message) for 3 month VV.  She is returning [facility] call about scheduling.

## 2024-04-18 NOTE — PATIENT INSTRUCTIONS
Change gabapentin to 600 mg (1 tab) three times a day. Please contact me in 1 week with an update on it  Continue sumatriptan 100 mg with headache onset. May take 2nd dose 2 hours later. Do not take more than 2 tabs in 24 hours.  Continue Zofran 4 mg every 6 hours as needed  Counseled on not to use estrogen based medications due to having migraines with aura and as a result are at increased risk for blood clots. Counseled also that migraines with aura in combination of tobacco use also increases risks for blood clots  Counseled on smoking cessation  Continue to follow with counselor

## 2024-04-18 NOTE — TELEPHONE ENCOUNTER
Called Pt to schedule her 3 month virtual follow up. I was unable to speak with her but left a vm.

## 2024-04-18 NOTE — PROGRESS NOTES
"Patient verified their name and date of birth prior to the visit.    This is a telemedicine visit that was performed with the originating site at home in [place] and the distant site at Dr. Teto Herbert's office in [place]. Verbal consent to participate in video visit was obtained. I discussed with the patient the nature of our telemedicine visits, that:      I would evaluate the patient and recommend diagnostics and treatments based on my assessment    Our sessions are not being recorded and that personal health information is protected and is documented in the their electronic medical record    Our team would provide follow up care in person if/when the patient needs it    Chief Complaint: Headache    Subjective:     History of Present Illness    Referring Provider: Self Referral   Accompanied by: No one     06/20/2023: Ayla Edmond is a 38 y.o. female with h/o anxiety, depression, trigeminal neuralgia, migraine with aura who presents for headache evaluation. She has had headaches for as long as she can remember and worsened with puberty and "migraines" started with first menstrual cycle, and then worsened a year ago when moved from [place] to [place]. Currently, headaches are daily, 2 hours to all day, usually resolves with ibuprofen, behind eyes, sometimes occipital, bitemple, pressure, stabbing sometimes, bad headaches are 3 times a week. She takes 2-3 ibuprofen daily. She has identified triggers as milk, nuts, possible weather change, stress. She has high neck pain that she has noticed is correlated to headache and has been going on for 5 years. She has right sided facial pain but has been on the left side before and started at age 22 that leveled out once she got on gabapentin, stabbing/electric that started in cheek and went to upper and lower jaw/mouth and has progressed to involve behind her ear, 5-20 mins, triggered if weather really hot or cold or touching the area or hot/cold foods. For " "headaches, she has associated photophobia, vomiting for bad headaches, imbalance, aura described as objects disappearing in visual field for 30 minutes prior to headache onset. For headaches and face pain, she has associated phonophobia, nausea (only gets it with bad face pain). For headaches and face pain, denies associated weakness, numbness, tingling, spinning, lightheadedness, other changes in vision. She has had all of her finger tips going numb/tingling for 2 years that has worsened in last 6 months that is noticeable when driving or sitting straight up and in 1/2023 or 2/2023 she woke up with numbness from mid shin down to all of her toes and took 1 month to resolve. She denies other instances of numbness/tingling in life. She notes 3-4 instances in life where she has complete weakness on one side of her body when she wakes up causing her too fall and does not remember which side of body has been impacted or if each side has been impacted at different times. She sleeps okay and has some nights where she does not sleep well and wakes up tired sometimes. She snores and does not know if has apnea. She denies a positional component and vasal vagal maneuvers do not significantly worsen headaches. Headaches will wake her up and will wake up with headaches. She has a menstrual correlate to headaches. Mood is trying to stay happy and always has underlying depression and anxiety. She follows with a counselor. For headaches, she has tried ibuprofen, Fioricet (made her feel sick), tramadol, sumatriptan (makes her "migraines" resolve and unsure if it causes nausea so takes Zofran), Tylenol. For face pain, she has tried carbamazepine that caused multiple side effects and made her feel awful and drunk, Lyrica that was too expensive, gabapentin (denies side effects other than restless feeling if restarts it after having not taken it regularly). She is taking gabapentin 300 mg four times a day that she was given by a walk in " clinic and was not given her normal dose and her previous neurologist had her on 600 mg TID.     Per chart review, she has tried gabapentin, sumatriptan, Zofran      01/17/2024: Ayla Edmond is a 38 y.o. female with h/o anxiety, depression, trigeminal neuralgia, bipolar 1 disorder, migraine with aura who presents for follow up. On last visit, increased gabapentin, continued sumatriptan and Zofran, ordered MRIs brain and C-spine, counseled on estrogen based medications and smoking cessation, and to follow up with counselor. In the interim, increased gabapentin. Headaches are near daily, was waking up with them in the middle night, behind eyes, posterior neck, tension, pressure, 2 hours, improves with ibuprofen and Tylenol. Her migraines have been better that she gets at least one a month but none in the past month and are stabbing occipital pain but can be different locations is best description and can last up to 1 day but can be resolve with sleep and Imitrex. With her migraines, she has associated photophobia, phonophobia, aura where cannot see central vision for 30 mins prior to headache onset. She will have photophobia sometimes with other headaches and no phonophobia or aura with her other headaches. For all headaches, she denies associated weakness, numbness, tingling, dizziness, N/V, change in vision. She will have nausea sometimes not associated with headaches. She will have difficulties with far vision separate from the headaches. Her sleep fluctuates and wakes up not rested. Mood is good and has gotten off methadone so is feeling better and has noticed has helped her migraines as well since being off of it. The increased gabapentin helps and is taking 600 mg in the morning, 600 mg in the afternoon, and 900 mg at night and noticed taking an extra one in the morning one time provided further help and denies side effects. Sumatriptan helps and causes nausea maybe and then takes Zofran and denies side  effects to Zofran. She is following with her counselor. She notes since last visit she has been diagnosed with bipolar 1 and started on Lexapro and Zyprexa that are helping. She missed her appointment for MRI. She has reduced tobacco use.    04/18/2024: Ayla Edmond is a 39 y.o. female with h/o anxiety, depression, trigeminal neuralgia, bipolar 1 disorder, migraine with aura who presents for follow up. On last visit, increased gabapentin, continued sumatriptan and Zofran, ordered MRIs brain and C-spine, counseled on estrogen based medications and smoking cessation, and to follow up with counselor. She states that her trigeminal neuralgia has been acting up for the last 2 weeks and for the last week has been constant with varying intensity, right side of face and cheek to right forehead to right side to right occipital, stabbing, electric, made worse by cold or hot or if put face on something and eating or drinking, feels okay when falls asleep. She notes that she went to a treatment center and sober living facility and had her gabapentin reduced to 400 mg BID and was increased to 300 mg TID by her PCP. She notes 900 mg TID was keeping trigeminal neuralgia gone and caused stomach pain. She has not had stomach pain on lower dose gabapentin. She is not sure if she has refills for her Imitrex and has not needed to take it since last visit as has not had a migraine and maybe causes stomach pain so takes Zofran with it. She denies side effects to Zofran. She is still following with counselor and does outpatient treatment with sober living.    Current Outpatient Medications on File Prior to Visit   Medication Sig Dispense Refill    cetirizine (ZYRTEC) 10 MG tablet Take 1 tablet (10 mg total) by mouth every evening. 30 tablet 11    fluticasone propionate (FLONASE) 50 mcg/actuation nasal spray 1 spray (50 mcg total) by Each Nostril route once daily. 11.1 mL 0    LEXAPRO 20 mg tablet Take 20 mg by mouth every morning.       lidocaine HCL 4 % Gel Apply 1 application topically.      ondansetron (ZOFRAN-ODT) 4 MG TbDL Take 1 tablet (4 mg total) by mouth every 6 (six) hours as needed (nausea). 21 tablet 2    sumatriptan (IMITREX) 100 MG tablet Take one, repeat in 2 hours if still symptomatic.  Do not exceed 2 doses in 1 day 9 tablet 2    ZYPREXA 15 mg Tab Take 15 mg by mouth every evening.      [DISCONTINUED] gabapentin (NEURONTIN) 300 MG capsule Take 3 capsules (900 mg total) by mouth 3 (three) times daily. 270 capsule 5     No current facility-administered medications on file prior to visit.       Review of patient's allergies indicates:   Allergen Reactions    Sulfamethoxazole-trimethoprim     Azithromycin Rash    Clarithromycin Nausea And Vomiting    Sulfa (sulfonamide antibiotics) Rash    Trimethoprim Hives and Nausea And Vomiting       Family History   Problem Relation Name Age of Onset    Hypertension Father      Cancer Maternal Grandmother          Lung Cancer    Migraines Maternal Grandfather         Social History     Tobacco Use    Smoking status: Every Day     Current packs/day: 0.50     Types: Cigarettes    Smokeless tobacco: Never   Substance Use Topics    Alcohol use: Not Currently    Drug use: Yes     Types: Marijuana       Review of Systems  Constitutional: No fevers, no chills, no change in weight  Eye/Vision: See HPI  Ear/Nose/Mouth/Throat: See HPI; no cough, no runny nose, no sore throat  Respiratory: No shortness of breath, no problems breathing  Cardiovascular: No chest pain  Gastrointestinal: See HPI, no diarrhea, no constipation  Genitourinary: No dysuria  Musculoskeletal: See HPI  Integumentary: No skin changes  Neurologic: See HPI  Psychiatric: depression, anxiety, denies SI and HI.    Objective:     There were no vitals filed for this visit.    General: Alert and awake, no acute distress, well groomed, well nourished  Eyes: Pupils are equal, round as best can be evaluated by camera; Extraocular movements are  intact; Normal conjunctiva  HENT: Normocephalic, Oral mucosa is moist    Neurologic Exam  Mental Status: orientated to time, person, and place; No aphasia or dysarthria. Follows complex commands    Cranial Nerves: as above, V1-V3 LT sensation WNL bilaterally per patient report when patient self performs, face symmetric, tongue protrusion midline and movements WNL    Muscle Tone/Motor Function: No drift. Normal rapidly alternating movements bilateral hands. No tremors. No abnormal movements per what is observable on camera    At least 4-/5 x4 per what is observable on camera    Sensory: LT WNL x4 per patient report when patient self performs    Reflexes: Unable to assess 2/2 limitations of telemedicine    Coordination: Finger to nose WNL bilaterally.    Gait: Gait WNL per what is observable on camera    Labs:    No new labs    Imaging:    I personally reviewed all diagnostic images and reports and agree with findings in the radiographical report as noted below unless otherwise specified.    MRI Brain 2/7/24:  FINDINGS:  Intracranial compartment:     There is no acute intracranial abnormality.  Brain volume, ventricular size and position are normal.  There is no hemorrhage or mass/mass effect.  There are no regions of abnormal signal intensity in the brain.  There are no regions of restricted diffusion to suggest acute infarction.  There is no pathologic enhancement.  The basilar cisterns are open.  There is no abnormal extra-axial fluid collection.  No abnormality is detected along the expected course of either 5th cranial nerve.  There is no abnormal signal or enhancement in the infratemporal fossa or cavernous sinuses.  Flow voids indicating patency are present in the major vessels at the base of the brain.  The cerebellar tonsils are in normal position.  The sellar structures are normal.  The orbits are grossly normal.     Skull/extracranial contents: Marrow signal intensity in the clivus and calvarium is grossly  normal.  The included paranasal sinuses and mastoid air cells are clear.  The included facial soft tissues and scalp are normal.     Impression:     1.  Normal imaging of the brain.  There is no acute abnormality.  There is no hemorrhage, mass/mass effect, acute infarction.  There is no pathologic enhancement.    My read: No acute intracranial abnormalities. Normal sella    MRI C-Spine 2/7/24:  FINDINGS:  Significantly motion degraded exam particular on the axial T2* sequence.     Morphology: Vertebral body heights are preserved and marrow signal is within normal limits.     Alignment: Cervical spinal alignment is normal.     Cord: The cervical cord shows normal signal content throughout its length allowing for motion artifact.  Slight contouring of the left ventral cord surface at C5-C6.     Craniocervical Junction: Cerebellar tonsils are normally positioned. The visualized portions of the posterior fossa are unremarkable. The regional osseous anatomy is normal.        Disc levels:        C2-C3: Within normal limits.     C3-C4: Within normal limits.     C4-C5: Minimal uncovertebral spurring producing no more than mild bilateral foraminal narrowing.  The spinal canal is patent..     C5-C6: Shallow broad-based disc osteophyte complex/extrusion lateralizing to the left mildly narrowing the spinal canal.  Slight contouring of the left ventral cord surface without overt compression.  Uncovertebral spurring contributes to moderate to severe left and mild right foraminal narrowing.     C6-C7: Shallow broad-based disc osteophyte complex.  Uncovertebral spurring produces no more than mild bilateral foraminal narrowing.  The spinal canal is patent..     C7-T1: Within normal limits.     Soft tissues: The visualized paraspinal soft tissues are within normal limits.        Impression:     1. Normal appearance of the cervical cord allowing for significant motion artifact.  No acute process.  2. Degenerative changes which are  relatively mild from C4-C5 to C6-C7 most pronounced at C5-C6 where there is a left lateralizing disc osteophyte complex/extrusion mildly narrowing the spinal canal and contributing to moderate to severe left foraminal narrowing.  This could be correlated for potential left C6 selective nerve impingement.    My read: No acute spinal cord abnormalities. Significant stenosis at left foraminal C5-C6      Assessment:       ICD-10-CM ICD-9-CM    1. Migraine with aura and without status migrainosus, not intractable  G43.109 346.00       2. Trigeminal neuralgia of right side of face  G50.0 350.1       3. Medication monitoring encounter  Z51.81 V58.83          39 y.o. female with h/o anxiety, depression, trigeminal neuralgia, migraine with aura who presents for follow up. Neurologic exam limited by the use of telemedicine but no observed focal neurological deficits at this time and on initial exam, she has decreased temperature left foot, hyperreflexia throughout with positive left Bar[place]ona. Headaches meet criteria for migraine with aura and criteria for prophylactic and abortive therapy. She is describing likely right sided trigeminal neuralgia but a little unusual that it can be left side. For numbness/tingling in fingers and one episode in left lower leg and episodes of transient weakness, concerning for a CNS process like demyelination so will got MRI Brain WOW and MRI C-Spine that were unrevealing. Got the MRI Brain to evaluate trigeminal system and because she has headache red flag for increased intracranial pressure and MRI was unrevealing. We discussed previously [facility] free smoking cessation program and she is going to think about it. Discussed previously how mood can impact her pain. Overall, her migraines are improving and her trigeminal neuralgia has worsened with gabapentin being decreased.    Plan:     Change gabapentin to 600 mg (1 tab) three times a day. Please contact me in 1 week with an update on  it  Continue sumatriptan 100 mg with headache onset. May take 2nd dose 2 hours later. Do not take more than 2 tabs in 24 hours.  Continue Zofran 4 mg every 6 hours as needed  Counseled on not to use estrogen based medications due to having migraines with aura and as a result are at increased risk for blood clots. Counseled also that migraines with aura in combination of tobacco use also increases risks for blood clots  Counseled on smoking cessation  Continue to follow with counselor    Teot Herbert MD  [facility]

## 2024-04-24 ENCOUNTER — PATIENT MESSAGE (OUTPATIENT)
Dept: NEUROLOGY | Facility: CLINIC | Age: 39
End: 2024-04-24
Payer: MEDICAID

## 2024-05-27 ENCOUNTER — PATIENT MESSAGE (OUTPATIENT)
Dept: NEUROLOGY | Facility: CLINIC | Age: 39
End: 2024-05-27
Payer: MEDICAID

## 2024-05-28 RX ORDER — GABAPENTIN 600 MG/1
TABLET ORAL
Qty: 105 TABLET | Refills: 11 | Status: SHIPPED | OUTPATIENT
Start: 2024-05-28

## 2024-07-01 ENCOUNTER — PATIENT MESSAGE (OUTPATIENT)
Dept: NEUROLOGY | Facility: CLINIC | Age: 39
End: 2024-07-01
Payer: MEDICAID

## 2024-08-26 ENCOUNTER — TELEPHONE (OUTPATIENT)
Dept: NEUROLOGY | Facility: CLINIC | Age: 39
End: 2024-08-26
Payer: MEDICAID

## 2024-08-26 NOTE — TELEPHONE ENCOUNTER
Pt was returning my call from earlier. I let Pt know that Dr. Herbert has sent in a new prescription.

## 2024-08-26 NOTE — TELEPHONE ENCOUNTER
Called Pt to let her know that the provider has sent in a new prescription. I was unable to speak with her but left a vm.       ----- Message from Teto Herbert MD sent at 8/26/2024  8:54 AM CDT -----  Regarding: RE: New RX  Contact: [phone]  Please advise updated prescription sent to pharmacy. Thanks!  ----- Message -----  From: Anneliese Scott MA  Sent: 8/26/2024   8:26 AM CDT  To: Teto Herbert MD  Subject: FW: New RX                                         ----- Message -----  From: Melia Childs  Sent: 8/26/2024   8:25 AM CDT  To: Piedad Irene Staff  Subject: New RX                                           Patient is calling in reference to medication: gabapentin (NEURONTIN) 600 MG tablet. She is stating the wrong quantity was sent over to pharmacy and wants the office to send over a new script to pharmacy below. Please give pt a call if needed to further discuss.    [employer]  [address] [place] [address]  Phone Number: [phone]

## 2024-09-04 ENCOUNTER — TELEPHONE (OUTPATIENT)
Dept: NEUROLOGY | Facility: CLINIC | Age: 39
End: 2024-09-04
Payer: MEDICAID

## 2024-09-04 NOTE — TELEPHONE ENCOUNTER
Spoke to Pt about her scheduling an appt. Pt is scheduled for September 19. Appt has been added to the wait list.     ----- Message from Teto Herbert MD sent at 9/4/2024  9:11 AM CDT -----  Contact: [phone]  Please advise that at this time she is overdue for follow up and will need to schedule a follow up before any further increases in her gabapentin. Thanks!  ----- Message -----  From: Anneliese Scott MA  Sent: 9/4/2024   8:58 AM CDT  To: Teto Herbert MD    Please advise. Thank you  ----- Message -----  From: Fela Meléndez  Sent: 9/4/2024   8:48 AM CDT  To: Piedad Irene Staff    PATIENTCALL     Pt is calling to speak with someone in provider office regarding she wants to see if the provider can up her dosage on her  gabapentin (NEURONTIN)  she states that she is still in a lot of pain. She is asking for a return call please call.

## 2024-09-19 ENCOUNTER — OFFICE VISIT (OUTPATIENT)
Dept: NEUROLOGY | Facility: CLINIC | Age: 39
End: 2024-09-19
Payer: MEDICAID

## 2024-09-19 DIAGNOSIS — R11.0 NAUSEA: ICD-10-CM

## 2024-09-19 DIAGNOSIS — G50.0 TRIGEMINAL NEURALGIA OF RIGHT SIDE OF FACE: Primary | ICD-10-CM

## 2024-09-19 DIAGNOSIS — G43.109 MIGRAINE WITH AURA AND WITHOUT STATUS MIGRAINOSUS, NOT INTRACTABLE: ICD-10-CM

## 2024-09-19 PROCEDURE — 1159F MED LIST DOCD IN RCRD: CPT | Mod: CPTII,95,, | Performed by: PSYCHIATRY & NEUROLOGY

## 2024-09-19 PROCEDURE — 99214 OFFICE O/P EST MOD 30 MIN: CPT | Mod: 95,,, | Performed by: PSYCHIATRY & NEUROLOGY

## 2024-09-19 PROCEDURE — 1160F RVW MEDS BY RX/DR IN RCRD: CPT | Mod: CPTII,95,, | Performed by: PSYCHIATRY & NEUROLOGY

## 2024-09-19 RX ORDER — ONDANSETRON 4 MG/1
4 TABLET, ORALLY DISINTEGRATING ORAL EVERY 6 HOURS PRN
Qty: 21 TABLET | Refills: 2 | Status: SHIPPED | OUTPATIENT
Start: 2024-09-19

## 2024-09-19 RX ORDER — GABAPENTIN 600 MG/1
TABLET ORAL
Qty: 150 TABLET | Refills: 11 | Status: SHIPPED | OUTPATIENT
Start: 2024-09-19

## 2024-09-19 NOTE — PROGRESS NOTES
"Patient verified their name and date of birth prior to the visit.    This is a telemedicine visit that was performed with the originating site at work in [place] and the distant site at Dr. Teto Herbert's office in [place]. Verbal consent to participate in video visit was obtained. I discussed with the patient the nature of our telemedicine visits, that:      I would evaluate the patient and recommend diagnostics and treatments based on my assessment    Our sessions are not being recorded and that personal health information is protected and is documented in the their electronic medical record    Our team would provide follow up care in person if/when the patient needs it    Chief Complaint: Headache    Subjective:     History of Present Illness    Referring Provider: Self Referral   Accompanied by: No one     06/20/2023: Ayla Edmond is a 38 y.o. female with h/o anxiety, depression, trigeminal neuralgia, migraine with aura who presents for headache evaluation. She has had headaches for as long as she can remember and worsened with puberty and "migraines" started with first menstrual cycle, and then worsened a year ago when moved from [place] to [place]. Currently, headaches are daily, 2 hours to all day, usually resolves with ibuprofen, behind eyes, sometimes occipital, bitemple, pressure, stabbing sometimes, bad headaches are 3 times a week. She takes 2-3 ibuprofen daily. She has identified triggers as milk, nuts, possible weather change, stress. She has high neck pain that she has noticed is correlated to headache and has been going on for 5 years. She has right sided facial pain but has been on the left side before and started at age 22 that leveled out once she got on gabapentin, stabbing/electric that started in cheek and went to upper and lower jaw/mouth and has progressed to involve behind her ear, 5-20 mins, triggered if weather really hot or cold or touching the area or hot/cold foods. For " "headaches, she has associated photophobia, vomiting for bad headaches, imbalance, aura described as objects disappearing in visual field for 30 minutes prior to headache onset. For headaches and face pain, she has associated phonophobia, nausea (only gets it with bad face pain). For headaches and face pain, denies associated weakness, numbness, tingling, spinning, lightheadedness, other changes in vision. She has had all of her finger tips going numb/tingling for 2 years that has worsened in last 6 months that is noticeable when driving or sitting straight up and in 1/2023 or 2/2023 she woke up with numbness from mid shin down to all of her toes and took 1 month to resolve. She denies other instances of numbness/tingling in life. She notes 3-4 instances in life where she has complete weakness on one side of her body when she wakes up causing her too fall and does not remember which side of body has been impacted or if each side has been impacted at different times. She sleeps okay and has some nights where she does not sleep well and wakes up tired sometimes. She snores and does not know if has apnea. She denies a positional component and vasal vagal maneuvers do not significantly worsen headaches. Headaches will wake her up and will wake up with headaches. She has a menstrual correlate to headaches. Mood is trying to stay happy and always has underlying depression and anxiety. She follows with a counselor. For headaches, she has tried ibuprofen, Fioricet (made her feel sick), tramadol, sumatriptan (makes her "migraines" resolve and unsure if it causes nausea so takes Zofran), Tylenol. For face pain, she has tried carbamazepine that caused multiple side effects and made her feel awful and drunk, Lyrica that was too expensive, gabapentin (denies side effects other than restless feeling if restarts it after having not taken it regularly). She is taking gabapentin 300 mg four times a day that she was given by a walk in " clinic and was not given her normal dose and her previous neurologist had her on 600 mg TID.     Per chart review, she has tried gabapentin, sumatriptan, Zofran      01/17/2024: Ayla Edmond is a 38 y.o. female with h/o anxiety, depression, trigeminal neuralgia, bipolar 1 disorder, migraine with aura who presents for follow up. On last visit, increased gabapentin, continued sumatriptan and Zofran, ordered MRIs brain and C-spine, counseled on estrogen based medications and smoking cessation, and to follow up with counselor. In the interim, increased gabapentin. Headaches are near daily, was waking up with them in the middle night, behind eyes, posterior neck, tension, pressure, 2 hours, improves with ibuprofen and Tylenol. Her migraines have been better that she gets at least one a month but none in the past month and are stabbing occipital pain but can be different locations is best description and can last up to 1 day but can be resolve with sleep and Imitrex. With her migraines, she has associated photophobia, phonophobia, aura where cannot see central vision for 30 mins prior to headache onset. She will have photophobia sometimes with other headaches and no phonophobia or aura with her other headaches. For all headaches, she denies associated weakness, numbness, tingling, dizziness, N/V, change in vision. She will have nausea sometimes not associated with headaches. She will have difficulties with far vision separate from the headaches. Her sleep fluctuates and wakes up not rested. Mood is good and has gotten off methadone so is feeling better and has noticed has helped her migraines as well since being off of it. The increased gabapentin helps and is taking 600 mg in the morning, 600 mg in the afternoon, and 900 mg at night and noticed taking an extra one in the morning one time provided further help and denies side effects. Sumatriptan helps and causes nausea maybe and then takes Zofran and denies side  effects to Zofran. She is following with her counselor. She notes since last visit she has been diagnosed with bipolar 1 and started on Lexapro and Zyprexa that are helping. She missed her appointment for MRI. She has reduced tobacco use.     04/18/2024: Ayla Edmond is a 39 y.o. female with h/o anxiety, depression, trigeminal neuralgia, bipolar 1 disorder, migraine with aura who presents for follow up. On last visit, increased gabapentin, continued sumatriptan and Zofran, ordered MRIs brain and C-spine, counseled on estrogen based medications and smoking cessation, and to follow up with counselor. She states that her trigeminal neuralgia has been acting up for the last 2 weeks and for the last week has been constant with varying intensity, right side of face and cheek to right forehead to right side to right occipital, stabbing, electric, made worse by cold or hot or if put face on something and eating or drinking, feels okay when falls asleep. She notes that she went to a treatment center and sober living facility and had her gabapentin reduced to 400 mg BID and was increased to 300 mg TID by her PCP. She notes 900 mg TID was keeping trigeminal neuralgia gone and caused stomach pain. She has not had stomach pain on lower dose gabapentin. She is not sure if she has refills for her Imitrex and has not needed to take it since last visit as has not had a migraine and maybe causes stomach pain so takes Zofran with it. She denies side effects to Zofran. She is still following with counselor and does outpatient treatment with sober living. In the interim, increased gabapentin twice.     09/19/2024: Ayla Edmond is a 39 y.o. female with h/o anxiety, depression, trigeminal neuralgia, bipolar 1 disorder, migraine with aura who presents for follow up. On last visit, changed gabapentin, continued sumatriptan and Zofran, counseled on estrogen based medications and smoking cessation, and to follow up with counselor. She  states her pain is getting worse, starts right cheek and goes to right forehead to behind right ear to right neck, shooting, 2 minutes, worse at night but can have multiple episodes during the day that is every 30-60 mins. She has associated photophobia, phonophobia, nausea. She gets tingling in hands sometimes that she thinks is from her neck. She denies weakness, numbness, dizziness, vomiting, change in vision. She sometimes has difficulties falling asleep and once she sleeps she is good and wakes up rested when gets more sleep and pain is less when is more rested. Mood is okay but pain can impact her mood. She takes 600 mg three times a day and then at night takes 900 mg and denies side effects. She only takes sumatriptan with her migraines and takes it once a month and by taking it and going to sleep her migraines go away and makes her nauseated so takes Zofran to counter act it. She also takes Zofran for nausea from pain and denies side effects. She is still seeing her counselor. She states used to be on Lyrica and had to stop it because insurance would not cover it and it helped.     Today, I personally reviewed the 2 ED notes that were completed after any previous visit to the [facility] clinic as documented in the patient's electronic medical record. This review was done to analyze the patient's progress and findings as it relates to the conditions that the sports neurology clinic is treating.    Current Outpatient Medications on File Prior to Visit   Medication Sig Dispense Refill    cetirizine (ZYRTEC) 10 MG tablet Take 1 tablet (10 mg total) by mouth every evening. 30 tablet 11    fluticasone propionate (FLONASE) 50 mcg/actuation nasal spray 1 spray (50 mcg total) by Each Nostril route once daily. 11.1 mL 0    LEXAPRO 20 mg tablet Take 20 mg by mouth every morning.      lidocaine HCL 4 % Gel Apply 1 application topically.      sumatriptan (IMITREX) 100 MG tablet Take one, repeat in 2 hours if still  symptomatic.  Do not exceed 2 doses in 1 day 9 tablet 2    ZYPREXA 15 mg Tab Take 15 mg by mouth every evening.      [DISCONTINUED] gabapentin (NEURONTIN) 600 MG tablet Take 900 mg (1.5 tab) QAM, 600 mg (1 tab) afternoon, 900 mg (1.5 tabs)  tablet 11    [DISCONTINUED] ondansetron (ZOFRAN-ODT) 4 MG TbDL Take 1 tablet (4 mg total) by mouth every 6 (six) hours as needed (nausea). 21 tablet 2     No current facility-administered medications on file prior to visit.       Review of patient's allergies indicates:   Allergen Reactions    Sulfamethoxazole-trimethoprim     Azithromycin Rash    Clarithromycin Nausea And Vomiting    Sulfa (sulfonamide antibiotics) Rash    Trimethoprim Hives and Nausea And Vomiting       Family History   Problem Relation Name Age of Onset    Hypertension Father      Cancer Maternal Grandmother          Lung Cancer    Migraines Maternal Grandfather         Social History     Tobacco Use    Smoking status: Every Day     Current packs/day: 0.50     Types: Cigarettes    Smokeless tobacco: Never   Substance Use Topics    Alcohol use: Not Currently    Drug use: Not Currently     Types: Marijuana, Heroin     Comment: Sober for 2 weeks       Review of Systems  Constitutional: No fevers, no chills, no change in weight  Eye/Vision: See HPI  Ear/Nose/Mouth/Throat: See HPI; no cough, no runny nose, no sore throat  Respiratory: No shortness of breath, no problems breathing  Cardiovascular: No chest pain  Gastrointestinal: See HPI, no diarrhea, sometimes constipation  Genitourinary: No dysuria  Musculoskeletal: See HPI  Integumentary: No skin changes  Neurologic: See HPI  Psychiatric: Denies depression, denies anxiety, denies SI and HI.    Objective:     There were no vitals filed for this visit.    Audio-video quality was bad throughout visit    General: Alert and awake, no acute distress, well groomed, well nourished  Eyes: Pupils are equal, round as best can be evaluated by camera; Extraocular  movements are intact; Normal conjunctiva  HENT: Normocephalic, Oral mucosa is moist    Neurologic Exam  Mental Status: orientated to time, person, and place; No aphasia or dysarthria. Follows complex commands    Cranial Nerves: as above, V1-V3 LT sensation WNL bilaterally per patient report when patient self performs, face symmetric    Muscle Tone/Motor Function: No tremors. No abnormal movements per what is observable on camera    Sensory: LT WNL x4 per patient report when patient self performs    Reflexes: Unable to assess 2/2 limitations of telemedicine    Coordination: Unable to assess due to poor video quality    Gait: Unable to assess due to poor video quality    Labs:    7/15/24:  CRP 55.5    Imaging:    I personally reviewed all diagnostic reports below.    CT Head 6/3/24:  FINDINGS: The density of the cerebral parenchyma is normal. The lateral ventricles are midline in position and normal in size. No intracranial hemorrhage, edema, mass effect, or midline shift is noted.     The calvarium is intact. There is prominent hyperostosis frontalis interna, a normal variant. The mastoid antra and middle ear cavities are clear.  Significant soft tissue opacity in the right sphenoid sinuses consistent with mucoperiosteal thickening. There is probable minimal mucoperiosteal thickening in the posterior right ethmoid sinus. There is mild, sinuous curvature of the bony nasal septum. There are small josias bullosa deformities in the bilateral nasal turbinates.     IMPRESSION:     1. Normal nonintravenous contrast CT appearance of the brain, with no acute intracranial abnormality.     2.  Moderate, chronic-appearing right sphenoid sinusitis with minimal, chronic-appearing right ethmoid sinusitis.     Assessment:       ICD-10-CM ICD-9-CM    1. Trigeminal neuralgia of right side of face  G50.0 350.1 ondansetron (ZOFRAN-ODT) 4 MG TbDL      2. Migraine with aura and without status migrainosus, not intractable  G43.109 346.00        3. Nausea  R11.0 787.02          39 y.o. female with h/o anxiety, depression, trigeminal neuralgia, migraine with aura who presents for follow up. Neurologic exam limited by the use of telemedicine but no observed focal neurological deficits at this time and on initial exam, she has decreased temperature left foot, hyperreflexia throughout with positive left [place]. Headaches meet criteria for migraine with aura and criteria for prophylactic and abortive therapy. She is describing likely right sided trigeminal neuralgia but a little unusual that it can be left side. For numbness/tingling in fingers and one episode in left lower leg and episodes of transient weakness, concerning for a CNS process like demyelination so got MRI Brain WOW and MRI C-Spine that were unrevealing. Got the MRI Brain to evaluate trigeminal system and because she has headache red flag for increased intracranial pressure and MRI was unrevealing. We discussed previously [facility] free smoking cessation program and she is going to think about it. Discussed previously how mood can impact her pain. Overall, her migraines are improving and her trigeminal neuralgia is persistent so will increase gabapentin.     At this time, I am managing multiple chronic illnesses.    Today's medical decision making was based on the number and complexity of problems addressed as documented in today's encounter, risks of complications due to prescription drug management as documented in today's encounter    Plan:     Increase gabapentin to 900 mg (1.5 tabs) in the morning, 600 mg (1 tab) at lunch, 600 mg (1 tab) in late afternoon, 900 mg (1.5) at bedtime time   Continue sumatriptan 100 mg with headache onset. May take 2nd dose 2 hours later. Do not take more than 2 tabs in 24 hours.  Continue Zofran 4 mg every 6 hours as needed  Counseled on not to use estrogen based medications due to having migraines with aura and as a result are at increased risk for blood  clots. Counseled also that migraines with aura in combination of tobacco use also increases risks for blood clots  Counseled on smoking cessation  Continue to follow with counselor    Teto Herbert MD  Sports Neu[facility]

## 2024-09-19 NOTE — PATIENT INSTRUCTIONS
Increase gabapentin to 900 mg (1.5 tabs) in the morning, 600 mg (1 tab) at lunch, 600 mg (1 tab) in late afternoon, 900 mg (1.5) at bedtime time   Continue sumatriptan 100 mg with headache onset. May take 2nd dose 2 hours later. Do not take more than 2 tabs in 24 hours.  Continue Zofran 4 mg every 6 hours as needed  Counseled on not to use estrogen based medications due to having migraines with aura and as a result are at increased risk for blood clots. Counseled also that migraines with aura in combination of tobacco use also increases risks for blood clots  Counseled on smoking cessation  Continue to follow with counselor

## 2024-10-30 ENCOUNTER — PATIENT MESSAGE (OUTPATIENT)
Dept: NEUROLOGY | Facility: CLINIC | Age: 39
End: 2024-10-30
Payer: MEDICAID

## 2024-11-04 ENCOUNTER — E-VISIT (OUTPATIENT)
Dept: NEUROLOGY | Facility: CLINIC | Age: 39
End: 2024-11-04
Payer: MEDICAID

## 2024-11-04 DIAGNOSIS — G50.0 TRIGEMINAL NEURALGIA OF RIGHT SIDE OF FACE: Primary | ICD-10-CM

## 2024-11-04 RX ORDER — GABAPENTIN 600 MG/1
900 TABLET ORAL 4 TIMES DAILY
Qty: 180 TABLET | Refills: 11 | Status: SHIPPED | OUTPATIENT
Start: 2024-11-04

## 2024-12-12 ENCOUNTER — OFFICE VISIT (OUTPATIENT)
Dept: NEUROLOGY | Facility: CLINIC | Age: 39
End: 2024-12-12
Payer: MEDICAID

## 2024-12-12 ENCOUNTER — PATIENT MESSAGE (OUTPATIENT)
Dept: NEUROLOGY | Facility: CLINIC | Age: 39
End: 2024-12-12

## 2024-12-12 DIAGNOSIS — G43.109 MIGRAINE WITH AURA AND WITHOUT STATUS MIGRAINOSUS, NOT INTRACTABLE: Primary | ICD-10-CM

## 2024-12-12 DIAGNOSIS — Z51.81 MEDICATION MONITORING ENCOUNTER: ICD-10-CM

## 2024-12-12 DIAGNOSIS — R11.0 NAUSEA: ICD-10-CM

## 2024-12-12 DIAGNOSIS — G44.86 CERVICOGENIC HEADACHE: ICD-10-CM

## 2024-12-12 DIAGNOSIS — G50.0 TRIGEMINAL NEURALGIA OF RIGHT SIDE OF FACE: ICD-10-CM

## 2024-12-12 PROCEDURE — 1159F MED LIST DOCD IN RCRD: CPT | Mod: CPTII,95,, | Performed by: PSYCHIATRY & NEUROLOGY

## 2024-12-12 PROCEDURE — 99214 OFFICE O/P EST MOD 30 MIN: CPT | Mod: 95,,, | Performed by: PSYCHIATRY & NEUROLOGY

## 2024-12-12 PROCEDURE — 1160F RVW MEDS BY RX/DR IN RCRD: CPT | Mod: CPTII,95,, | Performed by: PSYCHIATRY & NEUROLOGY

## 2024-12-12 RX ORDER — GABAPENTIN 600 MG/1
TABLET ORAL
Qty: 180 TABLET | Refills: 11 | Status: SHIPPED | OUTPATIENT
Start: 2024-12-12 | End: 2024-12-13

## 2024-12-12 NOTE — PROGRESS NOTES
"Patient verified their name and date of birth prior to the visit.    This is a telemedicine visit that was performed with the originating site in [place] and the distant site at Dr. Teto Herbert's office in [place]. Verbal consent to participate in video visit was obtained. I discussed with the patient the nature of our telemedicine visits, that:      I would evaluate the patient and recommend diagnostics and treatments based on my assessment    Our sessions are not being recorded and that personal health information is protected and is documented in the their electronic medical record    Our team would provide follow up care in person if/when the patient needs it    Chief Complaint: Headache    Subjective:     History of Present Illness    Referring Provider: Self Referral   Accompanied by: No one     06/20/2023: Ayla Edmond is a 38 y.o. female with h/o anxiety, depression, trigeminal neuralgia, migraine with aura who presents for headache evaluation. She has had headaches for as long as she can remember and worsened with puberty and "migraines" started with first menstrual cycle, and then worsened a year ago when moved from [place] to [place]. Currently, headaches are daily, 2 hours to all day, usually resolves with ibuprofen, behind eyes, sometimes occipital, bitemple, pressure, stabbing sometimes, bad headaches are 3 times a week. She takes 2-3 ibuprofen daily. She has identified triggers as milk, nuts, possible weather change, stress. She has high neck pain that she has noticed is correlated to headache and has been going on for 5 years. She has right sided facial pain but has been on the left side before and started at age 22 that leveled out once she got on gabapentin, stabbing/electric that started in cheek and went to upper and lower jaw/mouth and has progressed to involve behind her ear, 5-20 mins, triggered if weather really hot or cold or touching the area or hot/cold foods. For headaches, " "she has associated photophobia, vomiting for bad headaches, imbalance, aura described as objects disappearing in visual field for 30 minutes prior to headache onset. For headaches and face pain, she has associated phonophobia, nausea (only gets it with bad face pain). For headaches and face pain, denies associated weakness, numbness, tingling, spinning, lightheadedness, other changes in vision. She has had all of her finger tips going numb/tingling for 2 years that has worsened in last 6 months that is noticeable when driving or sitting straight up and in 1/2023 or 2/2023 she woke up with numbness from mid shin down to all of her toes and took 1 month to resolve. She denies other instances of numbness/tingling in life. She notes 3-4 instances in life where she has complete weakness on one side of her body when she wakes up causing her too fall and does not remember which side of body has been impacted or if each side has been impacted at different times. She sleeps okay and has some nights where she does not sleep well and wakes up tired sometimes. She snores and does not know if has apnea. She denies a positional component and vasal vagal maneuvers do not significantly worsen headaches. Headaches will wake her up and will wake up with headaches. She has a menstrual correlate to headaches. Mood is trying to stay happy and always has underlying depression and anxiety. She follows with a counselor. For headaches, she has tried ibuprofen, Fioricet (made her feel sick), tramadol, sumatriptan (makes her "migraines" resolve and unsure if it causes nausea so takes Zofran), Tylenol. For face pain, she has tried carbamazepine that caused multiple side effects and made her feel awful and drunk, Lyrica that was too expensive, gabapentin (denies side effects other than restless feeling if restarts it after having not taken it regularly). She is taking gabapentin 300 mg four times a day that she was given by a walk in clinic and " was not given her normal dose and her previous neurologist had her on 600 mg TID.     Per chart review, she has tried gabapentin, sumatriptan, Zofran      01/17/2024: Ayla Edmond is a 38 y.o. female with h/o anxiety, depression, trigeminal neuralgia, bipolar 1 disorder, migraine with aura who presents for follow up. On last visit, increased gabapentin, continued sumatriptan and Zofran, ordered MRIs brain and C-spine, counseled on estrogen based medications and smoking cessation, and to follow up with counselor. In the interim, increased gabapentin. Headaches are near daily, was waking up with them in the middle night, behind eyes, posterior neck, tension, pressure, 2 hours, improves with ibuprofen and Tylenol. Her migraines have been better that she gets at least one a month but none in the past month and are stabbing occipital pain but can be different locations is best description and can last up to 1 day but can be resolve with sleep and Imitrex. With her migraines, she has associated photophobia, phonophobia, aura where cannot see central vision for 30 mins prior to headache onset. She will have photophobia sometimes with other headaches and no phonophobia or aura with her other headaches. For all headaches, she denies associated weakness, numbness, tingling, dizziness, N/V, change in vision. She will have nausea sometimes not associated with headaches. She will have difficulties with far vision separate from the headaches. Her sleep fluctuates and wakes up not rested. Mood is good and has gotten off methadone so is feeling better and has noticed has helped her migraines as well since being off of it. The increased gabapentin helps and is taking 600 mg in the morning, 600 mg in the afternoon, and 900 mg at night and noticed taking an extra one in the morning one time provided further help and denies side effects. Sumatriptan helps and causes nausea maybe and then takes Zofran and denies side effects to  Zofran. She is following with her counselor. She notes since last visit she has been diagnosed with bipolar 1 and started on Lexapro and Zyprexa that are helping. She missed her appointment for MRI. She has reduced tobacco use.     04/18/2024: Ayla Edmond is a 39 y.o. female with h/o anxiety, depression, trigeminal neuralgia, bipolar 1 disorder, migraine with aura who presents for follow up. On last visit, increased gabapentin, continued sumatriptan and Zofran, ordered MRIs brain and C-spine, counseled on estrogen based medications and smoking cessation, and to follow up with counselor. She states that her trigeminal neuralgia has been acting up for the last 2 weeks and for the last week has been constant with varying intensity, right side of face and cheek to right forehead to right side to right occipital, stabbing, electric, made worse by cold or hot or if put face on something and eating or drinking, feels okay when falls asleep. She notes that she went to a treatment center and sober living facility and had her gabapentin reduced to 400 mg BID and was increased to 300 mg TID by her PCP. She notes 900 mg TID was keeping trigeminal neuralgia gone and caused stomach pain. She has not had stomach pain on lower dose gabapentin. She is not sure if she has refills for her Imitrex and has not needed to take it since last visit as has not had a migraine and maybe causes stomach pain so takes Zofran with it. She denies side effects to Zofran. She is still following with counselor and does outpatient treatment with sober living. In the interim, increased gabapentin twice.      09/19/2024: Ayla Edmond is a 39 y.o. female with h/o anxiety, depression, trigeminal neuralgia, bipolar 1 disorder, migraine with aura who presents for follow up. On last visit, changed gabapentin, continued sumatriptan and Zofran, counseled on estrogen based medications and smoking cessation, and to follow up with counselor. She states her  pain is getting worse, starts right cheek and goes to right forehead to behind right ear to right neck, shooting, 2 minutes, worse at night but can have multiple episodes during the day that is every 30-60 mins. She has associated photophobia, phonophobia, nausea. She gets tingling in hands sometimes that she thinks is from her neck. She denies weakness, numbness, dizziness, vomiting, change in vision. She sometimes has difficulties falling asleep and once she sleeps she is good and wakes up rested when gets more sleep and pain is less when is more rested. Mood is okay but pain can impact her mood. She takes 600 mg three times a day and then at night takes 900 mg and denies side effects. She only takes sumatriptan with her migraines and takes it once a month and by taking it and going to sleep her migraines go away and makes her nauseated so takes Zofran to counter act it. She also takes Zofran for nausea from pain and denies side effects. She is still seeing her counselor. She states used to be on Lyrica and had to stop it because insurance would not cover it and it helped.     12/12/2024: Ayla Edmond is a 39 y.o. female with h/o anxiety, depression, trigeminal neuralgia, bipolar 1 disorder, migraine with aura who presents for follow up. On last visit, increased gabapentin, continued sumatriptan and Zofran, counseled on estrogen based medications and smoking cessation, and to follow up with counselor. She states that she is doing okay and has been doing better. She has been getting more headaches but thinks maybe from less sleep and working more. Headaches are near daily, bifrontal, behind eyes, base of skull that can radiate down neck, pressure, states last what she calls a migraine was 6 months ago. She has associated photophobia, phonophobia, nausea only with her migraines that she has not had lately. She denies associated weakness, tingling, vomiting, dizziness, change in vision, aura. She gets numbness in  hands sometimes. She wakes up more tired. Mood is okay. Increased gabapentin helps and has been getting pain at night more recently and denies side effects. She has not needed to take sumatriptan. She has taken Zofran sometimes and denies side effects. She is following with her counselor.     Current Outpatient Medications on File Prior to Visit   Medication Sig Dispense Refill    cetirizine (ZYRTEC) 10 MG tablet Take 1 tablet (10 mg total) by mouth every evening. 30 tablet 11    fluticasone propionate (FLONASE) 50 mcg/actuation nasal spray 1 spray (50 mcg total) by Each Nostril route once daily. 11.1 mL 0    LEXAPRO 20 mg tablet Take 20 mg by mouth every morning.      lidocaine HCL 4 % Gel Apply 1 application topically.      ondansetron (ZOFRAN-ODT) 4 MG TbDL Take 1 tablet (4 mg total) by mouth every 6 (six) hours as needed (nausea). 21 tablet 2    sumatriptan (IMITREX) 100 MG tablet Take one, repeat in 2 hours if still symptomatic.  Do not exceed 2 doses in 1 day 9 tablet 2    ZYPREXA 15 mg Tab Take 15 mg by mouth every evening.      [DISCONTINUED] gabapentin (NEURONTIN) 600 MG tablet Take 1.5 tablets (900 mg total) by mouth 4 (four) times daily. 180 tablet 11     No current facility-administered medications on file prior to visit.       Review of patient's allergies indicates:   Allergen Reactions    Sulfamethoxazole-trimethoprim     Azithromycin Rash    Clarithromycin Nausea And Vomiting    Sulfa (sulfonamide antibiotics) Rash    Trimethoprim Hives and Nausea And Vomiting       Family History   Problem Relation Name Age of Onset    Hypertension Father      Cancer Maternal Grandmother          Lung Cancer    Migraines Maternal Grandfather         Social History     Tobacco Use    Smoking status: Every Day     Current packs/day: 0.50     Types: Cigarettes    Smokeless tobacco: Never   Substance Use Topics    Alcohol use: Not Currently    Drug use: Not Currently     Types: Marijuana, Heroin     Comment: Sober for 2  weeks       Review of Systems  Constitutional: No fevers, no chills, no change in weight  Eye/Vision: See HPI  Ear/Nose/Mouth/Throat: See HPI; no cough, no runny nose, no sore throat  Respiratory: No shortness of breath, no problems breathing  Cardiovascular: No chest pain  Gastrointestinal: See HPI, sometimes diarrhea, sometimes constipation  Genitourinary: No dysuria  Musculoskeletal: See HPI  Integumentary: No skin changes  Neurologic: See HPI  Psychiatric: Denies depression, denies anxiety, denies SI and HI.    Objective:     There were no vitals filed for this visit.    General: Alert and awake, no acute distress, well groomed, well nourished  Eyes: Pupils are equal, round as best can be evaluated by camera; Extraocular movements are intact; Normal conjunctiva  HENT: Normocephalic, Oral mucosa is moist    Neurologic Exam  Mental Status: orientated to time, person, and place; No aphasia or dysarthria.     Cranial Nerves: as above, V1-V3 LT sensation WNL bilaterally per patient report when patient self performs, face symmetric, tongue protrusion midline and movements WNL    Muscle Tone/Motor Function: No drift. Normal rapidly alternating movements bilateral hands. No tremors. No abnormal movements per what is observable on camera    At least 4-/5 x4 per what is observable on camera    Sensory: LT WNL x4 per patient report when patient self performs    Reflexes: Unable to assess 2/2 limitations of telemedicine    Coordination: Finger to nose WNL bilaterally.    Gait: Gait WNL per what is observable on camera    Labs:    No new labs    Imaging:    No new studies    Assessment:       ICD-10-CM ICD-9-CM    1. Migraine with aura and without status migrainosus, not intractable  G43.109 346.00       2. Trigeminal neuralgia of right side of face  G50.0 350.1       3. Nausea  R11.0 787.02       4. Medication monitoring encounter  Z51.81 V58.83       5. Cervicogenic headache  G44.86 784.0          39 y.o. female with h/o  anxiety, depression, trigeminal neuralgia, migraine with aura who presents for follow up. Neurologic exam limited by the use of telemedicine but no observed focal neurological deficits at this time and on initial exam, she has decreased temperature left foot, hyperreflexia throughout with positive left [place]. Headaches meet criteria for migraine with aura and criteria for prophylactic and abortive therapy. She is describing likely right sided trigeminal neuralgia but a little unusual that it can be left side. For numbness/tingling in fingers and one episode in left lower leg and episodes of transient weakness, concerning for a CNS process like demyelination so got MRI Brain WOW and MRI C-Spine that were unrevealing. Got the MRI Brain to evaluate trigeminal system and because she has headache red flag for increased intracranial pressure and MRI was unrevealing. We [facility] cessation [facility] and she is going to think about it. Discussed previously how mood can impact her pain. Overall, her migraines are improving and her other headaches are worsening so will increase gabapentin.      At this time, I am managing multiple chronic illnesses.    Today's medical decision making was based on the number and complexity of problems addressed as documented in today's encounter, risks of complications due to prescription drug management as documented in today's encounter    Plan:     Increase gabapentin to 900 mg (1.5 tabs) in the morning, 600 mg (1 tab) at lunch, 900 mg (1.5 tab) in late afternoon, 900 mg (1.5) at bedtime time   Continue sumatriptan 100 mg with headache onset. May take 2nd dose 2 hours later. Do not take more than 2 tabs in 24 hours.  Continue Zofran 4 mg every 6 hours as needed  Counseled on not to use estrogen based medications due to having migraines with aura and as a result are at increased risk for blood clots. Counseled also that migraines with aura in combination of  tobacco use also increases risks for blood clots  Counseled on smoking cessation  Continue to follow with counselor    Teto Herbert MD  Sports Neu[facility]

## 2024-12-12 NOTE — PATIENT INSTRUCTIONS
Increase gabapentin to 900 mg (1.5 tabs) in the morning, 600 mg (1 tab) at lunch, 900 mg (1.5 tab) in late afternoon, 900 mg (1.5) at bedtime time   Continue sumatriptan 100 mg with headache onset. May take 2nd dose 2 hours later. Do not take more than 2 tabs in 24 hours.  Continue Zofran 4 mg every 6 hours as needed  Counseled on not to use estrogen based medications due to having migraines with aura and as a result are at increased risk for blood clots. Counseled also that migraines with aura in combination of tobacco use also increases risks for blood clots  Counseled on smoking cessation  Continue to follow with counselor

## 2024-12-13 RX ORDER — GABAPENTIN 300 MG/1
CAPSULE ORAL
Qty: 330 CAPSULE | Refills: 11 | Status: SHIPPED | OUTPATIENT
Start: 2024-12-13

## 2025-01-21 ENCOUNTER — PATIENT MESSAGE (OUTPATIENT)
Facility: CLINIC | Age: 40
End: 2025-01-21
Payer: MEDICAID

## 2025-02-19 ENCOUNTER — PATIENT MESSAGE (OUTPATIENT)
Facility: CLINIC | Age: 40
End: 2025-02-19
Payer: MEDICAID

## 2025-04-29 ENCOUNTER — TELEPHONE (OUTPATIENT)
Facility: CLINIC | Age: 40
End: 2025-04-29
Payer: MEDICAID

## 2025-04-29 NOTE — TELEPHONE ENCOUNTER
Called patient to inform her of med increase. No answer, left voicemail.    ----- Message from Teto Herbert MD sent at 4/29/2025  8:52 AM CDT -----  Please advise may increase to:Increase gabapentin to 900 mg (1.5 tabs) in the morning, 600 mg (1 tab) at lunch, 900 mg (1.5 tab) in late afternoon, 900 mg (1.5) at bedtime time Thanks!  ----- Message -----  From: Aleisha Magdaleno MA  Sent: 4/28/2025   4:19 PM CDT  To: Teto Herbert MD      ----- Message -----  From: Poly Romero  Sent: 4/28/2025   3:43 PM CDT  To: Piedad Irene Staff    Pt calling in regards to increasing her medication due to pain gabapentin (NEURONTIN) 600 MG tabletConfirmed patient's contact info below:Contact Name: Ayla EdmondPhone Number: [phone]

## 2025-05-20 ENCOUNTER — TELEPHONE (OUTPATIENT)
Facility: CLINIC | Age: 40
End: 2025-05-20
Payer: MEDICAID

## 2025-05-20 NOTE — TELEPHONE ENCOUNTER
Called Pt to confirm tomorrow's appt. I was unable to speak with her but was unable to leave a voicemail, as well, since her mailbox was full.

## 2025-08-26 ENCOUNTER — TELEPHONE (OUTPATIENT)
Dept: NEUROLOGY | Facility: CLINIC | Age: 40
End: 2025-08-26
Payer: MEDICAID